# Patient Record
Sex: MALE | Race: WHITE | Employment: UNEMPLOYED | URBAN - METROPOLITAN AREA
[De-identification: names, ages, dates, MRNs, and addresses within clinical notes are randomized per-mention and may not be internally consistent; named-entity substitution may affect disease eponyms.]

---

## 2017-12-06 ENCOUNTER — HOSPITAL ENCOUNTER (OUTPATIENT)
Dept: WOUND CARE | Age: 47
Discharge: HOME | End: 2017-12-06
Payer: MEDICAID

## 2017-12-06 VITALS
WEIGHT: 315 LBS | DIASTOLIC BLOOD PRESSURE: 92 MMHG | BODY MASS INDEX: 47.74 KG/M2 | TEMPERATURE: 97.2 F | HEART RATE: 92 BPM | RESPIRATION RATE: 18 BRPM | SYSTOLIC BLOOD PRESSURE: 182 MMHG | HEIGHT: 68 IN

## 2017-12-06 PROBLEM — S81.809A WOUNDS, MULTIPLE OPEN, LOWER EXTREMITY: Status: ACTIVE | Noted: 2017-12-06

## 2017-12-06 PROCEDURE — 87070 CULTURE OTHR SPECIMN AEROBIC: CPT

## 2017-12-06 PROCEDURE — 87075 CULTR BACTERIA EXCEPT BLOOD: CPT

## 2017-12-06 PROCEDURE — 87077 CULTURE AEROBIC IDENTIFY: CPT

## 2017-12-06 PROCEDURE — 99213 OFFICE O/P EST LOW 20 MIN: CPT

## 2017-12-06 PROCEDURE — 87205 SMEAR GRAM STAIN: CPT

## 2017-12-06 RX ORDER — NYSTATIN 100000 [USP'U]/G
POWDER TOPICAL
COMMUNITY
Start: 2017-03-02

## 2017-12-06 RX ORDER — NYSTATIN 100000 U/G
CREAM TOPICAL
COMMUNITY
Start: 2017-10-02

## 2017-12-06 RX ORDER — PETROLATUM 430 MG/G
OINTMENT TOPICAL
COMMUNITY
Start: 2017-02-16

## 2017-12-06 RX ORDER — POTASSIUM CHLORIDE 20 MEQ/1
20 TABLET, EXTENDED RELEASE ORAL
COMMUNITY
Start: 2017-08-08

## 2017-12-06 RX ORDER — ATENOLOL AND CHLORTHALIDONE TABLET 50; 25 MG/1; MG/1
1 TABLET ORAL
COMMUNITY
Start: 2017-11-30

## 2017-12-06 RX ORDER — MAGNESIUM HYDROXIDE 1200 MG/15ML
LIQUID ORAL
Qty: 500 ML | Refills: 1 | Status: SHIPPED | OUTPATIENT
Start: 2017-12-06 | End: 2017-12-13

## 2017-12-06 NOTE — PROGRESS NOTES
Hellen Nolan 38   Progress Note and Procedure Note      Kevin Covarrubias  MEDICAL RECORD NUMBER:  [id number]  AGE: 56 y.o. GENDER: male  : 1970  EPISODE DATE:  2017    Subjective:     Chief Complaint   Patient presents with    Wound Check     both legs        HISTORY of PRESENT ILLNESS HPI     Kevin Covarrubias is a 56 y.o. male who presents today  for wound/ulcer evaluation. History of Wound Context: vasculitis ulcer  Wound/Ulcer Pain Timing/Severity: none  Quality of pain: N/A  Severity:  0 / 10   Modifying Factors: None  Associated Signs/Symptoms: edema, erythema and drainage  Multiple small partial thickness ulcerations and dark redness    Ulcer Identification:  Ulcer Type: vasculitis  Contributing Factors: obesity    Wound: N/A        PAST MEDICAL HISTORY        Diagnosis Date    Blood circulation, collateral     venous stasis,varicose veins,    Hernia, inguinal     Hyperlipidemia     Hypertension     Obesity     Obesity     Psychiatric problem     mild developementaly slow       PAST SURGICAL HISTORY    History reviewed. No pertinent surgical history. FAMILY HISTORY    Family History   Problem Relation Age of Onset    High Blood Pressure Mother     High Blood Pressure Brother        SOCIAL HISTORY    Social History   Substance Use Topics    Smoking status: Never Smoker    Smokeless tobacco: Never Used    Alcohol use No       ALLERGIES    Allergies   Allergen Reactions    Penicillin G Hives       MEDICATIONS    No current outpatient prescriptions on file prior to encounter. No current facility-administered medications on file prior to encounter. REVIEW OF SYSTEMS    Pertinent items are noted in HPI.     Objective:      BP (!) 182/92   Pulse 92   Temp 97.2 °F (36.2 °C) (Oral)   Resp 18   Ht 5' 8\" (1.727 m)   Wt (!) 342 lb (155.1 kg)   BMI 52.00 kg/m²     Wt Readings from Last 3 Encounters:   17 (!) 342 lb (155.1 kg)       PHYSICAL PM   Drainage Amount Small 12/6/2017  4:29 PM   Drainage Description Serosanguinous 12/6/2017  4:29 PM   Odor None 12/6/2017  4:29 PM   Margins Undefined edges 12/6/2017  4:29 PM   Maliha-wound Assessment Red;Dry 12/6/2017  4:29 PM   Non-staged Wound Description Partial thickness 12/6/2017  4:29 PM   Red%Wound Bed 100 12/6/2017  4:29 PM   Op First Treatment Date 12/06/17 12/6/2017  4:29 PM   Number of days: 0       P    Plan:     Treatment Note please see attached Discharge Instructions    In my professional opinion this patient would benefit from HBO Therapy: No    Written patient dismissal instructions given to patient and signed by patient or POA. Thank you for choosing [facility] and allowing us to help heal your wounds. If you should have any questions after your visit, please call [phone]. Discharge Instructions       Visit Discharge/Physician Orders:    Home Care: [address] [place] [address]    (Home Care To order dressings)      Wound Location:  Both Lower legs    Dressing orders: 1. Cleanse wound(s) with normal saline. 2. Apply dry SILVERCEL OR CALCIUM ALGINATE WITH Ag or eqivalent to wound bed. 3. Cover with 4x4's and wrap with gauze (jose martin or kerlix)  4. Change  Every other day or Monday, Wednesday, and Friday      Compression:  Apply med (10-20) compression hose to both lower legs, may remove at bedtime, reapply first thing in the morning, avoid prolonged standing, elevate legs when sitting    Other Instructions:   Culture at [facility]. Dr Joel Kerr to E-scribe Saline    Keep all dressings clean & dry. Do not shower, take baths or get wound wet, unless otherwise instructed by your Wound Care doctor. Follow up visit   3 Weeks  December 27, 3017  at    For Diabetic patients keep blood sugars below 150 for optimal wound healing.     If you experience any of the following, please call the Wound Care Service during business hours: [phone]   *Increase in pain   *Temperature over 101

## 2017-12-09 LAB
ANAEROBIC CULTURE: ABNORMAL
GRAM STAIN RESULT: ABNORMAL
ORGANISM: ABNORMAL
ORGANISM: ABNORMAL
WOUND/ABSCESS: ABNORMAL
WOUND/ABSCESS: ABNORMAL

## 2017-12-27 ENCOUNTER — HOSPITAL ENCOUNTER (OUTPATIENT)
Dept: WOUND CARE | Age: 47
Discharge: HOME | End: 2017-12-27
Payer: MEDICAID

## 2017-12-27 VITALS
TEMPERATURE: 97.1 F | HEART RATE: 77 BPM | RESPIRATION RATE: 18 BRPM | SYSTOLIC BLOOD PRESSURE: 118 MMHG | DIASTOLIC BLOOD PRESSURE: 73 MMHG

## 2017-12-27 PROCEDURE — 99213 OFFICE O/P EST LOW 20 MIN: CPT

## 2017-12-27 NOTE — PROGRESS NOTES
[address] [facility] Physician Billing Sheet. Ruthy Daniel  AGE: 56 y.o.    GENDER: male  : 1970  TODAY'S DATE:  2017    ICD-10 CODES  Active Hospital Problems    Diagnosis Date Noted    Wounds, multiple open, lower extremity [S81.809A] 2017       PHYSICIAN PROCEDURES    CPT CODE  87589      Electronically signed by Summer Colvin MD on 2017 at 3:05 PM

## 2017-12-27 NOTE — PROGRESS NOTES
Rinsed/Irrigated with saline 12/27/2017  2:17 PM   Wound Length (cm) 2 cm 12/27/2017  2:17 PM   Wound Width (cm) 1.8 cm 12/27/2017  2:17 PM   Wound Depth (cm)  0.1 12/27/2017  2:17 PM   Calculated Wound Size (cm^2) (l*w) 3.6 cm^2 12/27/2017  2:17 PM   Change in Wound Size % (l*w) 91.43 12/27/2017  2:17 PM   Drainage Amount Scant 12/27/2017  2:17 PM   Drainage Description Serosanguinous 12/27/2017  2:17 PM   Odor None 12/27/2017  2:17 PM   Margins Defined edges 12/27/2017  2:17 PM   Maliha-wound Assessment Dry;Red 12/27/2017  2:17 PM   Non-staged Wound Description Partial thickness 12/27/2017  2:17 PM   Red%Wound Bed 100 12/27/2017  2:17 PM   Op First Treatment Date 12/06/17 12/6/2017  4:29 PM   Number of days: 20         Plan:     Treatment Note please see attached Discharge Instructions    In my professional opinion this patient would benefit from HBO Therapy: No    Written patient dismissal instructions given to patient and signed by patient or POA. Thank you for choosing [facility] and allowing us to help heal your wounds. If you should have any questions after your visit, please call [phone]. Discharge Instructions          Discharge Instructions        Visit Discharge/Physician Orders:     Home Care: [address] [place] [address]    (Home Care To order dressings)      Wound Location:  Both Lower legs     Dressing orders: 1. Cleanse wound(s) with normal saline. 2. Apply dry SILVERCEL OR CALCIUM ALGINATE WITH Ag or eqivalent to wound bed. 3. Cover with 4x4's and wrap with gauze (ojse martin or kerlix)  4. Change  Every other day or Monday, Wednesday, and Friday      Compression:  Apply med (10-20) compression hose to both lower legs, may remove at bedtime, reapply first thing in the morning, avoid prolonged standing, elevate legs when sitting     Other Instructions:   Lotion to both legs around wounds with dressing changes     Keep all dressings clean & dry.  Do not shower, take baths or get wound wet, unless otherwise instructed by your Wound Care doctor.     Follow up visit   4 Weeks  January 24, 2018 at     For Diabetic patients keep blood sugars below 150 for optimal wound healing.     If you experience any of the following, please call the Wound Care Service during business hours: [phone]         *Increase in pain   *Temperature over 101    *Increase in drainage from your wound or a foul odor   *Uncontrolled swelling   *Need for compression bandage changes due to slippage, breakthrough drainage                                                                                                                                                                                                                                                                                                                                                                                              If you need medical attention outside of business hours, please contact your Primary Care Doctor or go to the nearest emergency room. Keep next scheduled appointment. Please give 24 hour notice if unable to keep appointment. [phone]     Patient Signature:_______________________      Nurse Signature_________________     Date: ___________ Time:  ____________          PLEASE NOTE: IF YOU ARE UNABLE TO OBTAIN WOUND SUPPLIES, CONTINUE TO USE THE SUPPLIES YOU HAVE AVAILABLE UNTIL YOU ARE ABLE TO REACH US.  IT IS MOST IMPORTANT TO KEEP THE WOUND COVERED AT ALL TIMES  Electronically signed by Vick Nuñez MD on 12/27/2017 at 3:00 PM          Electronically signed by Vick Nuñez MD on 12/27/2017 at 3:01 PM

## 2018-01-24 ENCOUNTER — HOSPITAL ENCOUNTER (OUTPATIENT)
Dept: WOUND CARE | Age: 48
Discharge: HOME | End: 2018-01-24
Payer: MEDICAID

## 2018-01-24 VITALS
DIASTOLIC BLOOD PRESSURE: 88 MMHG | SYSTOLIC BLOOD PRESSURE: 147 MMHG | RESPIRATION RATE: 18 BRPM | TEMPERATURE: 96.7 F | HEART RATE: 83 BPM

## 2018-01-24 PROCEDURE — 29580 STRAPPING UNNA BOOT: CPT

## 2018-01-24 RX ORDER — OMEPRAZOLE 20 MG/1
20 CAPSULE, DELAYED RELEASE ORAL DAILY
COMMUNITY

## 2018-01-24 NOTE — PROGRESS NOTES
Care doctor.     Follow up visit   5 Weeks  February 28, 2018 at     For Diabetic patients keep blood sugars below 150 for optimal wound healing.     If you experience any of the following, please call the Wound Care Service during business hours: [phone]         *Increase in pain   *Temperature over 101    *Increase in drainage from your wound or a foul odor   *Uncontrolled swelling   *Need for compression bandage changes due to slippage, breakthrough drainage     If you need medical attention outside of business hours, please contact your Primary Care Doctor or go to the nearest emergency room. Keep next scheduled appointment. Pascual Bonilla give 24 hour notice if unable to keep appointment. [phone]     Patient Signature:_______________________      Nurse Signature_________________     Date: ___________ Time:  ____________          PLEASE NOTE: IF YOU ARE UNABLE TO OBTAIN WOUND SUPPLIES, CONTINUE TO USE THE SUPPLIES YOU HAVE AVAILABLE UNTIL YOU ARE ABLE TO REACH US.  IT IS MOST IMPORTANT TO KEEP THE WOUND COVERED AT ALL TIMES  Electronically signed by Eliazar Garcia MD on 1/24/2018 at 2:53 PM          Electronically signed by Eliazar Garcia MD on 1/24/2018 at 2:54 PM

## 2018-04-04 ENCOUNTER — HOSPITAL ENCOUNTER (OUTPATIENT)
Dept: WOUND CARE | Age: 48
Discharge: HOME | End: 2018-04-04
Payer: MEDICAID

## 2018-04-04 VITALS
RESPIRATION RATE: 18 BRPM | TEMPERATURE: 96.4 F | HEART RATE: 86 BPM | DIASTOLIC BLOOD PRESSURE: 84 MMHG | SYSTOLIC BLOOD PRESSURE: 152 MMHG

## 2018-04-04 PROCEDURE — 99213 OFFICE O/P EST LOW 20 MIN: CPT

## 2018-05-02 ENCOUNTER — HOSPITAL ENCOUNTER (OUTPATIENT)
Dept: WOUND CARE | Age: 48
Discharge: HOME | End: 2018-05-02
Payer: MEDICAID

## 2018-05-02 VITALS
HEART RATE: 84 BPM | TEMPERATURE: 99.1 F | DIASTOLIC BLOOD PRESSURE: 77 MMHG | RESPIRATION RATE: 18 BRPM | SYSTOLIC BLOOD PRESSURE: 128 MMHG

## 2018-05-02 PROCEDURE — 99212 OFFICE O/P EST SF 10 MIN: CPT

## 2018-05-30 ENCOUNTER — HOSPITAL ENCOUNTER (OUTPATIENT)
Dept: WOUND CARE | Age: 48
Discharge: HOME | End: 2018-05-30
Payer: MEDICAID

## 2018-05-30 VITALS
DIASTOLIC BLOOD PRESSURE: 70 MMHG | RESPIRATION RATE: 18 BRPM | TEMPERATURE: 99 F | HEART RATE: 77 BPM | SYSTOLIC BLOOD PRESSURE: 125 MMHG

## 2018-05-30 PROCEDURE — 99211 OFF/OP EST MAY X REQ PHY/QHP: CPT

## 2021-06-30 LAB — RHEUMATOID FACTOR: <10 IU/ML (ref 0–15)

## 2023-12-04 RX ORDER — OMEPRAZOLE 20 MG/1
1 CAPSULE, DELAYED RELEASE ORAL DAILY
COMMUNITY
Start: 2020-07-31

## 2023-12-04 RX ORDER — POTASSIUM CHLORIDE 20 MEQ/1
10 TABLET, EXTENDED RELEASE ORAL 2 TIMES DAILY
COMMUNITY
Start: 2020-07-31 | End: 2024-02-09 | Stop reason: ALTCHOICE

## 2023-12-06 ENCOUNTER — APPOINTMENT (OUTPATIENT)
Dept: PRIMARY CARE | Facility: CLINIC | Age: 53
End: 2023-12-06
Payer: MEDICAID

## 2023-12-13 ENCOUNTER — OFFICE VISIT (OUTPATIENT)
Dept: PRIMARY CARE | Facility: CLINIC | Age: 53
End: 2023-12-13
Payer: MEDICAID

## 2023-12-13 VITALS
SYSTOLIC BLOOD PRESSURE: 154 MMHG | DIASTOLIC BLOOD PRESSURE: 90 MMHG | HEIGHT: 68 IN | TEMPERATURE: 97.7 F | WEIGHT: 304.4 LBS | BODY MASS INDEX: 46.13 KG/M2 | OXYGEN SATURATION: 97 % | HEART RATE: 69 BPM

## 2023-12-13 DIAGNOSIS — Z76.89 ESTABLISHING CARE WITH NEW DOCTOR, ENCOUNTER FOR: Primary | ICD-10-CM

## 2023-12-13 DIAGNOSIS — R32 URINARY INCONTINENCE, UNSPECIFIED TYPE: ICD-10-CM

## 2023-12-13 DIAGNOSIS — E55.9 VITAMIN D DEFICIENCY: ICD-10-CM

## 2023-12-13 DIAGNOSIS — Z00.00 HEALTHCARE MAINTENANCE: ICD-10-CM

## 2023-12-13 DIAGNOSIS — I10 ESSENTIAL HYPERTENSION, BENIGN: ICD-10-CM

## 2023-12-13 DIAGNOSIS — R35.0 URINARY FREQUENCY: ICD-10-CM

## 2023-12-13 DIAGNOSIS — R73.09 ELEVATED GLUCOSE: ICD-10-CM

## 2023-12-13 DIAGNOSIS — R20.9 COLD HANDS AND FEET: ICD-10-CM

## 2023-12-13 DIAGNOSIS — E66.01 MORBID OBESITY (MULTI): ICD-10-CM

## 2023-12-13 DIAGNOSIS — E78.2 MIXED HYPERLIPIDEMIA: ICD-10-CM

## 2023-12-13 DIAGNOSIS — I87.8 VENOUS STASIS: ICD-10-CM

## 2023-12-13 DIAGNOSIS — I1A.0 RESISTANT HYPERTENSION: ICD-10-CM

## 2023-12-13 PROBLEM — I87.2 VENOUS (PERIPHERAL) INSUFFICIENCY: Status: ACTIVE | Noted: 2023-12-13

## 2023-12-13 PROBLEM — F79 INTELLECTUAL DISABILITY: Status: ACTIVE | Noted: 2018-05-10

## 2023-12-13 PROBLEM — K81.0 ACUTE CHOLECYSTITIS: Status: ACTIVE | Noted: 2023-12-13

## 2023-12-13 LAB
POC APPEARANCE, URINE: CLEAR
POC BILIRUBIN, URINE: NEGATIVE
POC BLOOD, URINE: NEGATIVE
POC COLOR, URINE: ABNORMAL
POC GLUCOSE, URINE: NEGATIVE MG/DL
POC KETONES, URINE: NEGATIVE MG/DL
POC LEUKOCYTES, URINE: NEGATIVE
POC NITRITE,URINE: NEGATIVE
POC PH, URINE: 7.5 PH
POC PROTEIN, URINE: ABNORMAL MG/DL
POC SPECIFIC GRAVITY, URINE: 1.01
POC UROBILINOGEN, URINE: 0.2 EU/DL

## 2023-12-13 PROCEDURE — 3077F SYST BP >= 140 MM HG: CPT | Performed by: STUDENT IN AN ORGANIZED HEALTH CARE EDUCATION/TRAINING PROGRAM

## 2023-12-13 PROCEDURE — 1036F TOBACCO NON-USER: CPT | Performed by: STUDENT IN AN ORGANIZED HEALTH CARE EDUCATION/TRAINING PROGRAM

## 2023-12-13 PROCEDURE — 3008F BODY MASS INDEX DOCD: CPT | Performed by: STUDENT IN AN ORGANIZED HEALTH CARE EDUCATION/TRAINING PROGRAM

## 2023-12-13 PROCEDURE — 3080F DIAST BP >= 90 MM HG: CPT | Performed by: STUDENT IN AN ORGANIZED HEALTH CARE EDUCATION/TRAINING PROGRAM

## 2023-12-13 PROCEDURE — 99214 OFFICE O/P EST MOD 30 MIN: CPT | Performed by: STUDENT IN AN ORGANIZED HEALTH CARE EDUCATION/TRAINING PROGRAM

## 2023-12-13 PROCEDURE — 81002 URINALYSIS NONAUTO W/O SCOPE: CPT | Performed by: STUDENT IN AN ORGANIZED HEALTH CARE EDUCATION/TRAINING PROGRAM

## 2023-12-13 PROCEDURE — 87086 URINE CULTURE/COLONY COUNT: CPT

## 2023-12-13 RX ORDER — ASCORBIC ACID 500 MG
1 TABLET ORAL
COMMUNITY

## 2023-12-13 RX ORDER — NALTREXONE HYDROCHLORIDE 50 MG/1
50 TABLET, FILM COATED ORAL DAILY
COMMUNITY

## 2023-12-13 RX ORDER — POTASSIUM CHLORIDE 750 MG/1
10 TABLET, EXTENDED RELEASE ORAL 2 TIMES DAILY
COMMUNITY

## 2023-12-13 RX ORDER — VALSARTAN AND HYDROCHLOROTHIAZIDE 320; 25 MG/1; MG/1
1 TABLET, FILM COATED ORAL DAILY
COMMUNITY
End: 2024-02-09 | Stop reason: SDUPTHER

## 2023-12-13 RX ORDER — METOPROLOL SUCCINATE 100 MG/1
100 TABLET, EXTENDED RELEASE ORAL DAILY
COMMUNITY
End: 2024-02-09 | Stop reason: SDUPTHER

## 2023-12-13 RX ORDER — CLONIDINE 0.3 MG/24H
1 PATCH, EXTENDED RELEASE TRANSDERMAL
COMMUNITY
End: 2024-01-15 | Stop reason: SDUPTHER

## 2023-12-13 RX ORDER — BUPROPION HYDROCHLORIDE 150 MG/1
150 TABLET, EXTENDED RELEASE ORAL 2 TIMES DAILY
COMMUNITY
End: 2024-05-02

## 2023-12-13 RX ORDER — AMLODIPINE BESYLATE 10 MG/1
10 TABLET ORAL DAILY
COMMUNITY
Start: 2023-05-22 | End: 2024-02-09 | Stop reason: ALTCHOICE

## 2023-12-13 RX ORDER — CHOLECALCIFEROL (VITAMIN D3) 25 MCG
1 TABLET ORAL DAILY
COMMUNITY

## 2023-12-13 RX ORDER — ESCITALOPRAM OXALATE 5 MG/1
5 TABLET ORAL DAILY
COMMUNITY

## 2023-12-13 ASSESSMENT — PATIENT HEALTH QUESTIONNAIRE - PHQ9
SUM OF ALL RESPONSES TO PHQ9 QUESTIONS 1 AND 2: 0
2. FEELING DOWN, DEPRESSED OR HOPELESS: NOT AT ALL
1. LITTLE INTEREST OR PLEASURE IN DOING THINGS: NOT AT ALL

## 2023-12-13 ASSESSMENT — ENCOUNTER SYMPTOMS
DEPRESSION: 0
OCCASIONAL FEELINGS OF UNSTEADINESS: 0
LOSS OF SENSATION IN FEET: 1

## 2023-12-13 NOTE — PROGRESS NOTES
"Subjective   Patient ID: Colt Lewis is a 53 y.o. male who presents for [facility] (Patient is in office today for a new patient visit. Patient cousin advises that patient he gets anxiety when he has to go for walks. Also patient gets infection under his skin folds on his stomach which he has a powder for. ), Urinary Frequency (Patient has been having a lot more urinary frequency and has to go right away. Patient has also been having incontinence at night. ), and feet and legs (Patient has been having feet and legs numbness along with hand numbness. Patient has a home nurse that comes out three days a week that wraps his feet. ).    New patient, [facility]  Having urinary incontinence and urinary frequency  Unstable walking  Fell on knee a few months, now fearful of falling  Used to walk more  Uses walker now, helps a lot  Is complaining of cold hands and feet  Has had issues with HTN in the past    Plan  Labs  Cards referral for resistant HTN as he is on 5 meds and not well controlled  Increase exercise  Log BP and follow-up in 1 month  Check urine- urine looks good    HPI     Review of Systems    Objective   /90 (BP Location: Right arm, Patient Position: Sitting, BP Cuff Size: Adult) Comment (BP Location): Lower  Pulse 69   Temp 36.5 °C (97.7 °F) (Temporal)   Ht 1.726 m (5' 7.95\")   Wt 138 kg (304 lb 6.4 oz)   SpO2 97% Comment: RA  BMI 46.35 kg/m²     Physical Exam  Vitals reviewed.   Constitutional:       General: He is not in acute distress.     Appearance: Normal appearance. He is not ill-appearing or toxic-appearing.   HENT:      Head: Atraumatic.      Mouth/Throat:      Mouth: Mucous membranes are moist.      Pharynx: Oropharynx is clear.   Eyes:      Extraocular Movements: Extraocular movements intact.      Conjunctiva/sclera: Conjunctivae normal.      Pupils: Pupils are equal, round, and reactive to light.   Cardiovascular:      Rate and Rhythm: Normal rate and regular rhythm.      " Pulses: Normal pulses.      Heart sounds: Normal heart sounds. No murmur heard.  Pulmonary:      Effort: Pulmonary effort is normal. No respiratory distress.      Breath sounds: Normal breath sounds.   Abdominal:      General: Abdomen is flat.      Palpations: Abdomen is soft.      Tenderness: There is no abdominal tenderness.   Musculoskeletal:      Right lower leg: No edema.      Left lower leg: No edema.   Skin:     General: Skin is warm and dry.      Capillary Refill: Capillary refill takes less than 2 seconds.      Findings: No rash.   Neurological:      General: No focal deficit present.      Mental Status: He is alert.      Cranial Nerves: No cranial nerve deficit.      Gait: Gait normal.   Psychiatric:         Mood and Affect: Mood normal.         Behavior: Behavior normal.         Assessment/Plan   Problem List Items Addressed This Visit             ICD-10-CM    Elevated glucose R73.09    Relevant Orders    Comprehensive metabolic panel    Essential hypertension, benign I10    Relevant Orders    CBC    Comprehensive metabolic panel    Mixed hyperlipidemia E78.2    Relevant Orders    Lipid panel    Morbid obesity (CMS/[facility]) E66.01    Relevant Orders    CBC    Venous stasis I87.8     Other Visit Diagnoses         Codes    Establishing care with new doctor, encounter for    -  Primary Z76.89    Vitamin D deficiency     E55.9    Relevant Orders    Magnesium    Vitamin D 25-Hydroxy,Total (for eval of Vitamin D levels)    Healthcare maintenance     Z00.00    Relevant Orders    CBC    Prostate Specific Antigen    Urinary frequency     R35.0    Relevant Orders    POCT UA (nonautomated) manually resulted    Urine Culture    Urinary incontinence, unspecified type     R32    Relevant Orders    POCT UA (nonautomated) manually resulted    Urine Culture    Cold hands and feet     R20.9    Relevant Orders    Thyroid Stimulating Hormone    T4, free    Vitamin B12    Resistant hypertension     I1A.0    Relevant Orders     Referral to Cardiology

## 2023-12-14 LAB — BACTERIA UR CULT: NORMAL

## 2024-01-15 DIAGNOSIS — I10 ESSENTIAL HYPERTENSION, BENIGN: Primary | ICD-10-CM

## 2024-01-15 RX ORDER — CLONIDINE 0.3 MG/24H
1 PATCH, EXTENDED RELEASE TRANSDERMAL
Qty: 8 PATCH | Refills: 1 | Status: SHIPPED | OUTPATIENT
Start: 2024-01-15 | End: 2024-04-29

## 2024-01-15 NOTE — TELEPHONE ENCOUNTER
12/13/2023    [facility] - [place], [place] - [address] [address]  Phone: [phone] Fax: [phone]    Next office visit none

## 2024-01-23 ENCOUNTER — APPOINTMENT (OUTPATIENT)
Dept: CARDIOLOGY | Facility: CLINIC | Age: 54
End: 2024-01-23
Payer: MEDICAID

## 2024-02-05 ENCOUNTER — APPOINTMENT (OUTPATIENT)
Dept: CARDIOLOGY | Facility: CLINIC | Age: 54
End: 2024-02-05
Payer: MEDICAID

## 2024-02-09 ENCOUNTER — OFFICE VISIT (OUTPATIENT)
Dept: CARDIOLOGY | Facility: CLINIC | Age: 54
End: 2024-02-09
Payer: MEDICAID

## 2024-02-09 ENCOUNTER — APPOINTMENT (OUTPATIENT)
Dept: LAB | Facility: LAB | Age: 54
End: 2024-02-09
Payer: MEDICAID

## 2024-02-09 DIAGNOSIS — L97.929 VARICOSE VEINS OF LOWER EXTREMITIES WITH ULCER AND INFLAMMATION (MULTI): ICD-10-CM

## 2024-02-09 DIAGNOSIS — L97.919 VARICOSE VEINS OF LOWER EXTREMITIES WITH ULCER AND INFLAMMATION (MULTI): ICD-10-CM

## 2024-02-09 DIAGNOSIS — I87.2 VENOUS (PERIPHERAL) INSUFFICIENCY: ICD-10-CM

## 2024-02-09 DIAGNOSIS — E66.01 MORBID OBESITY (MULTI): ICD-10-CM

## 2024-02-09 DIAGNOSIS — I10 ESSENTIAL HYPERTENSION, BENIGN: Primary | ICD-10-CM

## 2024-02-09 DIAGNOSIS — E78.2 MIXED HYPERLIPIDEMIA: ICD-10-CM

## 2024-02-09 DIAGNOSIS — I83.219 VARICOSE VEINS OF LOWER EXTREMITIES WITH ULCER AND INFLAMMATION (MULTI): ICD-10-CM

## 2024-02-09 DIAGNOSIS — I10 PRIMARY HYPERTENSION: Primary | ICD-10-CM

## 2024-02-09 DIAGNOSIS — F79 INTELLECTUAL DISABILITY: ICD-10-CM

## 2024-02-09 DIAGNOSIS — I83.229 VARICOSE VEINS OF LOWER EXTREMITIES WITH ULCER AND INFLAMMATION (MULTI): ICD-10-CM

## 2024-02-09 DIAGNOSIS — I1A.0 RESISTANT HYPERTENSION: ICD-10-CM

## 2024-02-09 PROCEDURE — 3008F BODY MASS INDEX DOCD: CPT | Performed by: INTERNAL MEDICINE

## 2024-02-09 PROCEDURE — 93000 ELECTROCARDIOGRAM COMPLETE: CPT | Performed by: INTERNAL MEDICINE

## 2024-02-09 PROCEDURE — 1036F TOBACCO NON-USER: CPT | Performed by: INTERNAL MEDICINE

## 2024-02-09 PROCEDURE — 99204 OFFICE O/P NEW MOD 45 MIN: CPT | Performed by: INTERNAL MEDICINE

## 2024-02-09 RX ORDER — AMLODIPINE BESYLATE 5 MG/1
5 TABLET ORAL DAILY
COMMUNITY
End: 2024-03-15

## 2024-02-09 RX ORDER — DOXAZOSIN 1 MG/1
1 TABLET ORAL NIGHTLY
Qty: 90 TABLET | Refills: 3 | Status: SHIPPED | OUTPATIENT
Start: 2024-02-09 | End: 2024-03-11 | Stop reason: SDUPTHER

## 2024-02-09 RX ORDER — NEBULIZER AND COMPRESSOR
1 EACH MISCELLANEOUS DAILY
Qty: 1 EACH | Refills: 0 | Status: SHIPPED | OUTPATIENT
Start: 2024-02-09

## 2024-02-09 RX ORDER — AMLODIPINE BESYLATE 10 MG/1
10 TABLET ORAL DAILY
Qty: 90 TABLET | Refills: 1 | Status: SHIPPED | OUTPATIENT
Start: 2024-02-09

## 2024-02-09 RX ORDER — METOPROLOL SUCCINATE 100 MG/1
100 TABLET, EXTENDED RELEASE ORAL DAILY
Qty: 90 TABLET | Refills: 1 | Status: SHIPPED | OUTPATIENT
Start: 2024-02-09

## 2024-02-09 RX ORDER — VALSARTAN AND HYDROCHLOROTHIAZIDE 320; 25 MG/1; MG/1
1 TABLET, FILM COATED ORAL DAILY
Qty: 90 TABLET | Refills: 1 | Status: SHIPPED | OUTPATIENT
Start: 2024-02-09

## 2024-02-09 NOTE — TELEPHONE ENCOUNTER
Patient POA is calling for med refills   Last OV 12/2023  Pending OV- none, sent to front to schedule physical.

## 2024-02-09 NOTE — PROGRESS NOTES
CARDIOLOGY NEW PATIENT OFFICE VISIT    Patient:  Colt Higuera  YOB: 1970  MRN: [id number]       Chief Complaint:      Chief Complaint   Patient presents with    New Patient Visit       History of Present Illness:     History of Present Illness: Mr. Colt Higuera is a 53-year-old male patient being seen at the request of Dr. Nick Newton for evaluation of cardiac status in the setting of resistant hypertension.  He is accompanied by his cousin.  She provided the majority of his history.  He has noted to be intellectually challenged.  He does not have any history of atherosclerotic heart or valvular heart disease.  He has a history of hypertension dating back more than 20 years.  He has been treated primarily at [facility].  Most recently he has been maintained on amlodipine, Catapres TTS patch, Toprol-XL, and valsartan HCT.  He has a history of hyperlipidemia.  No history of diabetes mellitus.  He does not smoke.  He has a history of morbid obesity ,chronic venous insufficiency, and chronic edema.  He has a home health nurse who comes out 3 days a week to wrap his legs.  He notes that several family members developed hypertension at young ages.  He does not specifically recall having had any evaluation for secondary hypertension.  He has been hospitalized on a few occasions with hypertensive urgency.  He apparently has not very compliant with his medications or with diet.  He admittedly is very sedentary.  He ambulates with use of a walker.    He denies any chest pain or shortness of breath.  He denies any orthopnea or PND.  He denies any palpitations, lightheadedness, near-syncope, or syncope.  He denies any fever, chills, or cough.  He denies any nausea, vomiting, or diaphoresis.  He denies any hemoptysis, hematemesis, melena, or hematochezia.    Lab studies dated March 31, 2013 showed a normal CBC and CMP with exception of potassium 2.9.  High-sensitivity troponin levels 8 and 10.   Magnesium level 1.87.  EKG in the office today shows sinus tachycardia with nonspecific ST-T wave changes.  An echocardiogram May 1, 2023 showed an estimated LV ejection fraction 60 to 65%.  Trivial tricuspid regurgitation.      Allergies:     Allergies   Allergen Reactions    Penicillins Hives    Sulfamethoxazole-Trimethoprim Rash          Outpatient Medications:     Current Outpatient Medications   Medication Instructions    amLODIPine (NORVASC) 10 mg, oral, Daily    amLODIPine (NORVASC) 5 mg, oral, Daily    buPROPion SR (WELLBUTRIN SR) 150 mg, oral, 2 times daily    calcium carb/mag oxide/Cu/zinc (calcium-magnesium-copper-zinc) tablet 1 tablet, oral, Daily RT    cholecalciferol (Vitamin D-3) 25 MCG (1000 UT) tablet 1 tablet, oral, Daily    cloNIDine (Catapres-TTS) 0.3 mg/24 hr patch 1 patch, transdermal, Weekly    escitalopram (LEXAPRO) 5 mg, oral, Daily    metoprolol succinate XL (TOPROL-XL) 100 mg, oral, Daily    naltrexone (DEPADE) 50 mg, oral, Daily    omeprazole (PriLOSEC) 20 mg DR capsule 1 capsule, oral, Daily    potassium chloride CR 10 mEq ER tablet 10 mEq, oral, 2 times daily    valsartan-hydrochlorothiazide (Diovan-HCT) 320-25 mg tablet 1 tablet, oral, Daily        Past Medical History:     No past medical history on file.    Social History:     Social History     Tobacco Use    Smoking status: Never    Smokeless tobacco: Never   Vaping Use    Vaping Use: Never used   Substance Use Topics    Alcohol use: Never    Drug use: Never       Family History:     Family History   Problem Relation Name Age of Onset    Hypertension Mother      Colon cancer Mother      Hypertension Father         Review of Systems:     12 point review of systems completed and is negative other than that detailed above.       Objective:     There were no vitals filed for this visit.    Wt Readings from Last 4 Encounters:   12/13/23 138 kg (304 lb 6.4 oz)   07/31/20 133 kg (294 lb)       Physical Examination:   GENERAL:  Well  developed, well nourished, in no acute distress.  CHEST:  Symmetric and nontender.  NEURO/PSYCH:  Alert and oriented times three with approppriate behavior and responses.  NECK:  Supple, no JVD, no bruit.  LUNGS:  Clear to auscultation bilaterally, normal respiratory effort.  HEART:  Rate and rhythm regular with no evident murmur, no gallop appreciated.        There are no rubs, clicks or heaves.  EXTREMITIES: Bilateral wraps in place.  Chronic venous stasis changes.  There is 3+ edema noted.  PERIPHERAL VASCULAR:  Pulses present and equally palpable; 2+ throughout.      Lab:     CBC:   Lab Results   Component Value Date    WBC 9.6 03/31/2023    RBC 5.23 03/31/2023    HGB 14.1 03/31/2023    HCT 42.6 03/31/2023     03/31/2023        CMP:    Lab Results   Component Value Date     04/01/2023    K 3.1 (L) 04/01/2023     04/01/2023    CO2 27 04/01/2023    BUN 6 04/01/2023    CREATININE 0.69 04/01/2023    GLUCOSE 140 (H) 04/01/2023    CALCIUM 9.1 04/01/2023       Magnesium:    Lab Results   Component Value Date    MG 1.87 03/31/2023       BNP:   Lab Results   Component Value Date    BNP 66 01/12/2023        PT/INR:    Lab Results   Component Value Date    PROTIME 14.6 (H) 03/31/2023    INR 1.3 (H) 03/31/2023       BMP:  Lab Results   Component Value Date     04/01/2023     03/31/2023     02/09/2023    K 3.1 (L) 04/01/2023    K 2.9 (LL) 03/31/2023    K 3.2 (L) 02/09/2023     04/01/2023     03/31/2023     02/09/2023    CO2 27 04/01/2023    CO2 27 03/31/2023    CO2 28 02/09/2023    BUN 6 04/01/2023    BUN 7 03/31/2023    BUN 6 02/09/2023    CREATININE 0.69 04/01/2023    CREATININE 0.68 03/31/2023    CREATININE 0.68 02/09/2023       CBC:  Lab Results   Component Value Date    WBC 9.6 03/31/2023    WBC 8.5 02/09/2023    WBC 11.1 01/12/2023    RBC 5.23 03/31/2023    RBC 4.99 02/09/2023    RBC 5.23 01/12/2023    HGB 14.1 03/31/2023    HGB 13.5 02/09/2023    HGB 14.0  01/12/2023    HCT 42.6 03/31/2023    HCT 41.2 02/09/2023    HCT 42.5 01/12/2023    MCV 81 03/31/2023    MCV 83 02/09/2023    MCV 81 01/12/2023    MCHC 33.1 03/31/2023    MCHC 32.8 02/09/2023    MCHC 32.9 01/12/2023    RDW 13.5 03/31/2023    RDW 13.8 02/09/2023    RDW 14.3 01/12/2023     03/31/2023     02/09/2023     01/12/2023       Cardiac Enzymes:    Lab Results   Component Value Date    TROPHS 10 03/31/2023    TROPHS 10 03/31/2023    TROPHS 8 03/31/2023       Hepatic Function Panel:    Lab Results   Component Value Date    ALKPHOS 91 03/31/2023    ALT 16 03/31/2023    AST 13 03/31/2023    PROT 7.2 03/31/2023    BILITOT 0.6 03/31/2023    BILIDIR 0.1 07/03/2020       Diagnostic Studies:     Echocardiogram  Result Date: 4/1/2023    CONCLUSIONS: 1. Left ventricle shows no apparent LVH or LV dilatation with preserved systolic function with an estimate ejection fraction in the range of 60-65% with no apparent regional wall motion abnormalities, Definity contrast was used for clarification. 2. LV diastolic dysfunction 3. Normal right ventricular size and function 4. Structurally and functionally normal aortic, mitral, tricuspid and pulmonic valves with trace tricuspid insufficiency with no elevation of the RVSP at 22 5. Normal left and right atrium 6. Normal aortic root and ascending aorta 7. Normal inferior vena cava diameter in the range of 1.6 possible less than 50% respiratory inspiratory response. 8. No apparent pericardial fusion, echo producing mass or intracardiac shunt 9. No comparison tracing available for comment Please excuse any errors in grammar or translation related to this dictation. Voice recognition software was utilized to prepare this document. Juaquin ALEGRE     CT head wo IV contrast  Result Date: 4/1/2023    No acute intracranial hemorrhage, mass effect or midline shift.  Nonspecific scattered white matter hypodensities favored to represent sequela of small vessel ischemia.         XR chest 1 view  Result Date: 3/31/2023    No radiographic evidence of acute cardiopulmonary disease.   Signed by James Damon MD      Problem List:     Patient Active Problem List   Diagnosis    Acute cholecystitis    Elevated glucose    Essential hypertension, benign    Gastroesophageal reflux disease    Intellectual disability    Mixed hyperlipidemia    Morbid obesity (CMS/HCC)    Varicose veins of lower extremities with ulcer and inflammation (CMS/HCC)    Venous (peripheral) insufficiency    Venous stasis       Assessment:     Problem List Items Addressed This Visit             ICD-10-CM    Intellectual disability F79    Mixed hyperlipidemia E78.2    Morbid obesity (CMS/HCC) E66.01    Varicose veins of lower extremities with ulcer and inflammation (CMS/HCC) I83.229, I83.219, L97.919, L97.929    Venous (peripheral) insufficiency I87.2     Other Visit Diagnoses         Codes    Primary hypertension    -  Primary I10    Relevant Orders    ECG 12 Lead (Completed)    Resistant hypertension     I1A.0    Relevant Medications    doxazosin (Cardura) 1 mg tablet    miscellaneous medical supply (Blood Pressure Cuff) misc    Other Relevant Orders    Follow Up In Cardiology    Vascular US renal artery duplex complete    Comprehensive metabolic panel    Renin Activity    Aldosterone/Renin Activity Ratio    Catecholamines, Fractionated, Plasma    Aldosterone, Serum    Metanephrines Plasma            Plan:     -He will be started on doxazosin 1 mg daily.      -He will otherwise be continued on his same medications.     -He was advised to restrict fluids to less than 2 liters per day.     -He was advised to restrict sodium with an aim of no more than 2 grams per day.     -He was advised on the need for regular exercise.    -He was advised on the need to follow a low carbohydrate/low saturated fat/weight reducing diet.     -He will be scheduled for a renal arterial ultrasound.    -Will obtain fractionated catecholamines and  metanephrines along with PRA/PAC levels.  He is not currently on Aldactone.  Will need to interpret these levels with the understanding that he currently is taking valsartan.    -Further recommendations to follow.

## 2024-03-04 ENCOUNTER — ANCILLARY PROCEDURE (OUTPATIENT)
Dept: CARDIOLOGY | Facility: CLINIC | Age: 54
End: 2024-03-04
Payer: MEDICAID

## 2024-03-04 ENCOUNTER — LAB (OUTPATIENT)
Dept: LAB | Facility: LAB | Age: 54
End: 2024-03-04
Payer: MEDICAID

## 2024-03-04 DIAGNOSIS — R73.09 ELEVATED GLUCOSE: ICD-10-CM

## 2024-03-04 DIAGNOSIS — E78.2 MIXED HYPERLIPIDEMIA: ICD-10-CM

## 2024-03-04 DIAGNOSIS — E66.01 MORBID OBESITY (MULTI): ICD-10-CM

## 2024-03-04 DIAGNOSIS — I1A.0 RESISTANT HYPERTENSION: ICD-10-CM

## 2024-03-04 DIAGNOSIS — I10 ESSENTIAL HYPERTENSION, BENIGN: ICD-10-CM

## 2024-03-04 DIAGNOSIS — R20.9 COLD HANDS AND FEET: ICD-10-CM

## 2024-03-04 DIAGNOSIS — I10 ESSENTIAL (PRIMARY) HYPERTENSION: ICD-10-CM

## 2024-03-04 DIAGNOSIS — Z00.00 HEALTHCARE MAINTENANCE: ICD-10-CM

## 2024-03-04 DIAGNOSIS — E55.9 VITAMIN D DEFICIENCY: ICD-10-CM

## 2024-03-04 LAB
25(OH)D3 SERPL-MCNC: 24 NG/ML (ref 30–100)
ALBUMIN SERPL BCP-MCNC: 4.1 G/DL (ref 3.4–5)
ALP SERPL-CCNC: 75 U/L (ref 33–120)
ALT SERPL W P-5'-P-CCNC: 10 U/L (ref 10–52)
ANION GAP SERPL CALC-SCNC: 14 MMOL/L (ref 10–20)
AST SERPL W P-5'-P-CCNC: 11 U/L (ref 9–39)
BILIRUB SERPL-MCNC: 0.4 MG/DL (ref 0–1.2)
BUN SERPL-MCNC: 12 MG/DL (ref 6–23)
CALCIUM SERPL-MCNC: 9.3 MG/DL (ref 8.6–10.3)
CHLORIDE SERPL-SCNC: 101 MMOL/L (ref 98–107)
CHOLEST SERPL-MCNC: 185 MG/DL (ref 0–199)
CHOLESTEROL/HDL RATIO: 4
CO2 SERPL-SCNC: 29 MMOL/L (ref 21–32)
CREAT SERPL-MCNC: 0.75 MG/DL (ref 0.5–1.3)
EGFRCR SERPLBLD CKD-EPI 2021: >90 ML/MIN/1.73M*2
ERYTHROCYTE [DISTWIDTH] IN BLOOD BY AUTOMATED COUNT: 14 % (ref 11.5–14.5)
GLUCOSE SERPL-MCNC: 100 MG/DL (ref 74–99)
HCT VFR BLD AUTO: 43.3 % (ref 41–52)
HDLC SERPL-MCNC: 45.8 MG/DL
HGB BLD-MCNC: 13.9 G/DL (ref 13.5–17.5)
LDLC SERPL CALC-MCNC: 118 MG/DL
MAGNESIUM SERPL-MCNC: 1.8 MG/DL (ref 1.6–2.4)
MCH RBC QN AUTO: 27.5 PG (ref 26–34)
MCHC RBC AUTO-ENTMCNC: 32.1 G/DL (ref 32–36)
MCV RBC AUTO: 86 FL (ref 80–100)
NON HDL CHOLESTEROL: 139 MG/DL (ref 0–149)
NRBC BLD-RTO: 0 /100 WBCS (ref 0–0)
PLATELET # BLD AUTO: 247 X10*3/UL (ref 150–450)
POTASSIUM SERPL-SCNC: 3.7 MMOL/L (ref 3.5–5.3)
PROT SERPL-MCNC: 7.4 G/DL (ref 6.4–8.2)
PSA SERPL-MCNC: 0.15 NG/ML
RBC # BLD AUTO: 5.05 X10*6/UL (ref 4.5–5.9)
SODIUM SERPL-SCNC: 140 MMOL/L (ref 136–145)
T4 FREE SERPL-MCNC: 0.94 NG/DL (ref 0.61–1.12)
TRIGL SERPL-MCNC: 108 MG/DL (ref 0–149)
TSH SERPL-ACNC: 1.11 MIU/L (ref 0.44–3.98)
VIT B12 SERPL-MCNC: 318 PG/ML (ref 211–911)
VLDL: 22 MG/DL (ref 0–40)
WBC # BLD AUTO: 7.6 X10*3/UL (ref 4.4–11.3)

## 2024-03-04 PROCEDURE — 83835 ASSAY OF METANEPHRINES: CPT

## 2024-03-04 PROCEDURE — 82306 VITAMIN D 25 HYDROXY: CPT

## 2024-03-04 PROCEDURE — 82607 VITAMIN B-12: CPT

## 2024-03-04 PROCEDURE — 83735 ASSAY OF MAGNESIUM: CPT

## 2024-03-04 PROCEDURE — 80053 COMPREHEN METABOLIC PANEL: CPT

## 2024-03-04 PROCEDURE — 82088 ASSAY OF ALDOSTERONE: CPT

## 2024-03-04 PROCEDURE — 93975 VASCULAR STUDY: CPT | Performed by: INTERNAL MEDICINE

## 2024-03-04 PROCEDURE — 84439 ASSAY OF FREE THYROXINE: CPT

## 2024-03-04 PROCEDURE — 80061 LIPID PANEL: CPT

## 2024-03-04 PROCEDURE — 85027 COMPLETE CBC AUTOMATED: CPT

## 2024-03-04 PROCEDURE — 93975 VASCULAR STUDY: CPT

## 2024-03-04 PROCEDURE — 36415 COLL VENOUS BLD VENIPUNCTURE: CPT

## 2024-03-04 PROCEDURE — 84153 ASSAY OF PSA TOTAL: CPT

## 2024-03-04 PROCEDURE — 82384 ASSAY THREE CATECHOLAMINES: CPT

## 2024-03-04 PROCEDURE — 84443 ASSAY THYROID STIM HORMONE: CPT

## 2024-03-05 NOTE — RESULT ENCOUNTER NOTE
Labs are mostly normal, except for low vitamins D and B12.  He should start taking a supplement for both of these.  1000 IU daily of vitamin D3 and 1000 mcg of vitamin B12 daily.  He should establish with a new PCP and follow these levels up in 3-6 months.  Thank you!

## 2024-03-06 ENCOUNTER — TELEPHONE (OUTPATIENT)
Dept: CARDIOLOGY | Facility: CLINIC | Age: 54
End: 2024-03-06
Payer: MEDICAID

## 2024-03-06 NOTE — TELEPHONE ENCOUNTER
----- Message from Sudeep Sanchez MD sent at 3/6/2024 12:02 PM EST -----  Renal arterial ultrasound reviewed and is normal.  Normal blood flow to the kidney arteries.  Continue same and follow-up as scheduled.  Thanks.

## 2024-03-07 ENCOUNTER — TELEPHONE (OUTPATIENT)
Dept: CARDIOLOGY | Facility: CLINIC | Age: 54
End: 2024-03-07
Payer: MEDICAID

## 2024-03-07 LAB
ALDOST SERPL-MCNC: 14.2 NG/DL
ALDOST/RENIN PLAS-RTO: 7.1 RATIO
ANNOTATION COMMENT IMP: NORMAL
METANEPHS SERPL-SCNC: <0.1 NMOL/L (ref 0–0.49)
NORMETANEPH FREE SERPL-SCNC: 0.2 NMOL/L (ref 0–0.89)
RENIN PLAS-CCNC: 2 NG/ML/HR

## 2024-03-07 NOTE — TELEPHONE ENCOUNTER
----- Message from Sudeep Sanchez MD sent at 3/7/2024  1:10 PM EST -----  Labs reviewed reviewed and are normal.  No evidence of any elevated adrenaline type hormone that could cause an increase of his blood pressure.  Continue same and follow-up as scheduled.  Thanks.

## 2024-03-11 DIAGNOSIS — I1A.0 RESISTANT HYPERTENSION: ICD-10-CM

## 2024-03-11 LAB
DOPAMINE SERPL-MCNC: 48 PG/ML (ref 0–48)
EPINEPH PLAS-MCNC: 30 PG/ML (ref 0–62)
NOREPINEPH PLAS-MCNC: 200 PG/ML (ref 0–874)

## 2024-03-11 NOTE — TELEPHONE ENCOUNTER
SPOKE WITH PT'S BROTHER STATES THAT PT TOOK VALSARTAN 320-25, AMLODIPINE 10 MG, METOPROLOL 100 MG THIS MORNING. PT RECHECKED BP WHILE ON PHONE AND READ 186/118 ADVISED THAT IF PT WHERE TO DEVELOP SYMPTOMS THAT HE NEEDS TO CALL 911. FORWARDED MESSAGE TO Dr. Sudeep Sanchez MD, FACC TO ADVISE. Apolinar

## 2024-03-11 NOTE — TELEPHONE ENCOUNTER
MARGOTH PT'S HOME HEALTH NURSE CALLING STATES THAT PT HAD RECEIVED LARGE CUFF AND TODAY WHEN TAKING BP  RIGHT ARM WAS READING 192/119 AND RIGHT WRIST /106.  LEFT ARM READ 193/114 LEFT WRIST /102 PULSE 65. STATES THAT PT IS ASYMPTOMATIC. NO ISSUES. STATES SINCE PT STARTED DOXAZOSIN 1 MG AT NIGHT THAT PT'S PRESSURES HAVE BEEN HIGHER. FORWARDED TO Dr. Sudeep Sanchez MD, FACC TO ADVISE. LEFT VOICEMAIL FOR PT TO CONTACT OFFICE FOR FURTHER INFORMATION. Apolinar

## 2024-03-12 RX ORDER — DOXAZOSIN 2 MG/1
2 TABLET ORAL
Qty: 180 TABLET | Refills: 3 | Status: SHIPPED | OUTPATIENT
Start: 2024-03-12 | End: 2025-03-12

## 2024-03-12 NOTE — TELEPHONE ENCOUNTER
----- Message from Sudeep Sanchez MD sent at 3/11/2024  5:29 PM EDT -----  Labs reviewed and look fine.  Nothing to suggest any type of adrenaline hormone in the blood which would up the blood pressure.  Continue same and follow-up as scheduled.

## 2024-03-15 ENCOUNTER — HOSPITAL ENCOUNTER (INPATIENT)
Facility: HOSPITAL | Age: 54
LOS: 2 days | Discharge: HOME | End: 2024-03-17
Attending: EMERGENCY MEDICINE | Admitting: STUDENT IN AN ORGANIZED HEALTH CARE EDUCATION/TRAINING PROGRAM
Payer: MEDICAID

## 2024-03-15 ENCOUNTER — APPOINTMENT (OUTPATIENT)
Dept: CARDIOLOGY | Facility: HOSPITAL | Age: 54
End: 2024-03-15
Payer: MEDICAID

## 2024-03-15 ENCOUNTER — APPOINTMENT (OUTPATIENT)
Dept: RADIOLOGY | Facility: HOSPITAL | Age: 54
End: 2024-03-15
Payer: MEDICAID

## 2024-03-15 DIAGNOSIS — R53.83 FATIGUE, UNSPECIFIED TYPE: ICD-10-CM

## 2024-03-15 DIAGNOSIS — I16.0 HYPERTENSIVE URGENCY: Primary | ICD-10-CM

## 2024-03-15 LAB
ALBUMIN SERPL BCP-MCNC: 4.1 G/DL (ref 3.4–5)
ALP SERPL-CCNC: 77 U/L (ref 33–120)
ALT SERPL W P-5'-P-CCNC: 12 U/L (ref 10–52)
ANION GAP SERPL CALC-SCNC: 11 MMOL/L (ref 10–20)
APPEARANCE UR: CLEAR
AST SERPL W P-5'-P-CCNC: 13 U/L (ref 9–39)
BASOPHILS # BLD AUTO: 0.03 X10*3/UL (ref 0–0.1)
BASOPHILS NFR BLD AUTO: 0.4 %
BILIRUB SERPL-MCNC: 0.3 MG/DL (ref 0–1.2)
BILIRUB UR STRIP.AUTO-MCNC: NEGATIVE MG/DL
BUN SERPL-MCNC: 12 MG/DL (ref 6–23)
CALCIUM SERPL-MCNC: 9.3 MG/DL (ref 8.6–10.3)
CHLORIDE SERPL-SCNC: 101 MMOL/L (ref 98–107)
CO2 SERPL-SCNC: 31 MMOL/L (ref 21–32)
COLOR UR: COLORLESS
CREAT SERPL-MCNC: 0.75 MG/DL (ref 0.5–1.3)
EGFRCR SERPLBLD CKD-EPI 2021: >90 ML/MIN/1.73M*2
EOSINOPHIL # BLD AUTO: 0.19 X10*3/UL (ref 0–0.7)
EOSINOPHIL NFR BLD AUTO: 2.6 %
ERYTHROCYTE [DISTWIDTH] IN BLOOD BY AUTOMATED COUNT: 13.7 % (ref 11.5–14.5)
FLUAV RNA RESP QL NAA+PROBE: NOT DETECTED
FLUBV RNA RESP QL NAA+PROBE: NOT DETECTED
GLUCOSE SERPL-MCNC: 96 MG/DL (ref 74–99)
GLUCOSE UR STRIP.AUTO-MCNC: NEGATIVE MG/DL
HCT VFR BLD AUTO: 43 % (ref 41–52)
HGB BLD-MCNC: 14.3 G/DL (ref 13.5–17.5)
IMM GRANULOCYTES # BLD AUTO: 0.02 X10*3/UL (ref 0–0.7)
IMM GRANULOCYTES NFR BLD AUTO: 0.3 % (ref 0–0.9)
KETONES UR STRIP.AUTO-MCNC: NEGATIVE MG/DL
LEUKOCYTE ESTERASE UR QL STRIP.AUTO: NEGATIVE
LYMPHOCYTES # BLD AUTO: 1.59 X10*3/UL (ref 1.2–4.8)
LYMPHOCYTES NFR BLD AUTO: 22.1 %
MCH RBC QN AUTO: 27.8 PG (ref 26–34)
MCHC RBC AUTO-ENTMCNC: 33.3 G/DL (ref 32–36)
MCV RBC AUTO: 84 FL (ref 80–100)
MONOCYTES # BLD AUTO: 0.54 X10*3/UL (ref 0.1–1)
MONOCYTES NFR BLD AUTO: 7.5 %
NEUTROPHILS # BLD AUTO: 4.84 X10*3/UL (ref 1.2–7.7)
NEUTROPHILS NFR BLD AUTO: 67.1 %
NITRITE UR QL STRIP.AUTO: NEGATIVE
NRBC BLD-RTO: 0 /100 WBCS (ref 0–0)
PH UR STRIP.AUTO: 8 [PH]
PLATELET # BLD AUTO: 250 X10*3/UL (ref 150–450)
POTASSIUM SERPL-SCNC: 3.7 MMOL/L (ref 3.5–5.3)
PROT SERPL-MCNC: 7.8 G/DL (ref 6.4–8.2)
PROT UR STRIP.AUTO-MCNC: NEGATIVE MG/DL
RBC # BLD AUTO: 5.14 X10*6/UL (ref 4.5–5.9)
RBC # UR STRIP.AUTO: NEGATIVE /UL
SARS-COV-2 RNA RESP QL NAA+PROBE: NOT DETECTED
SODIUM SERPL-SCNC: 139 MMOL/L (ref 136–145)
SP GR UR STRIP.AUTO: 1
UROBILINOGEN UR STRIP.AUTO-MCNC: <2 MG/DL
WBC # BLD AUTO: 7.2 X10*3/UL (ref 4.4–11.3)

## 2024-03-15 PROCEDURE — 99291 CRITICAL CARE FIRST HOUR: CPT | Performed by: HOSPITALIST

## 2024-03-15 PROCEDURE — 96374 THER/PROPH/DIAG INJ IV PUSH: CPT

## 2024-03-15 PROCEDURE — 2500000004 HC RX 250 GENERAL PHARMACY W/ HCPCS (ALT 636 FOR OP/ED): Performed by: HOSPITALIST

## 2024-03-15 PROCEDURE — 84443 ASSAY THYROID STIM HORMONE: CPT | Performed by: HOSPITALIST

## 2024-03-15 PROCEDURE — 2500000004 HC RX 250 GENERAL PHARMACY W/ HCPCS (ALT 636 FOR OP/ED): Performed by: PHYSICIAN ASSISTANT

## 2024-03-15 PROCEDURE — 96376 TX/PRO/DX INJ SAME DRUG ADON: CPT

## 2024-03-15 PROCEDURE — 71045 X-RAY EXAM CHEST 1 VIEW: CPT

## 2024-03-15 PROCEDURE — 36415 COLL VENOUS BLD VENIPUNCTURE: CPT | Performed by: PHYSICIAN ASSISTANT

## 2024-03-15 PROCEDURE — 80053 COMPREHEN METABOLIC PANEL: CPT | Performed by: PHYSICIAN ASSISTANT

## 2024-03-15 PROCEDURE — 71045 X-RAY EXAM CHEST 1 VIEW: CPT | Performed by: RADIOLOGY

## 2024-03-15 PROCEDURE — 2500000001 HC RX 250 WO HCPCS SELF ADMINISTERED DRUGS (ALT 637 FOR MEDICARE OP): Performed by: HOSPITALIST

## 2024-03-15 PROCEDURE — 99285 EMERGENCY DEPT VISIT HI MDM: CPT | Mod: 25

## 2024-03-15 PROCEDURE — 99291 CRITICAL CARE FIRST HOUR: CPT | Performed by: PHYSICIAN ASSISTANT

## 2024-03-15 PROCEDURE — 96375 TX/PRO/DX INJ NEW DRUG ADDON: CPT

## 2024-03-15 PROCEDURE — 99291 CRITICAL CARE FIRST HOUR: CPT

## 2024-03-15 PROCEDURE — 2500000002 HC RX 250 W HCPCS SELF ADMINISTERED DRUGS (ALT 637 FOR MEDICARE OP, ALT 636 FOR OP/ED): Performed by: HOSPITALIST

## 2024-03-15 PROCEDURE — 2020000001 HC ICU ROOM DAILY

## 2024-03-15 PROCEDURE — 81003 URINALYSIS AUTO W/O SCOPE: CPT | Performed by: PHYSICIAN ASSISTANT

## 2024-03-15 PROCEDURE — 93005 ELECTROCARDIOGRAM TRACING: CPT

## 2024-03-15 PROCEDURE — 87636 SARSCOV2 & INF A&B AMP PRB: CPT | Performed by: PHYSICIAN ASSISTANT

## 2024-03-15 PROCEDURE — 85025 COMPLETE CBC W/AUTO DIFF WBC: CPT | Performed by: PHYSICIAN ASSISTANT

## 2024-03-15 PROCEDURE — 2500000001 HC RX 250 WO HCPCS SELF ADMINISTERED DRUGS (ALT 637 FOR MEDICARE OP): Performed by: PHYSICIAN ASSISTANT

## 2024-03-15 PROCEDURE — 80307 DRUG TEST PRSMV CHEM ANLYZR: CPT | Performed by: HOSPITALIST

## 2024-03-15 RX ORDER — CLONIDINE HYDROCHLORIDE 0.1 MG/1
0.1 TABLET ORAL ONCE
Status: COMPLETED | OUTPATIENT
Start: 2024-03-15 | End: 2024-03-15

## 2024-03-15 RX ORDER — LABETALOL HYDROCHLORIDE 5 MG/ML
20 INJECTION, SOLUTION INTRAVENOUS ONCE
Status: COMPLETED | OUTPATIENT
Start: 2024-03-15 | End: 2024-03-15

## 2024-03-15 RX ORDER — METOPROLOL SUCCINATE 50 MG/1
100 TABLET, EXTENDED RELEASE ORAL DAILY
Status: DISCONTINUED | OUTPATIENT
Start: 2024-03-16 | End: 2024-03-17 | Stop reason: HOSPADM

## 2024-03-15 RX ORDER — ASCORBIC ACID 500 MG
1 TABLET ORAL
Status: DISCONTINUED | OUTPATIENT
Start: 2024-03-16 | End: 2024-03-15

## 2024-03-15 RX ORDER — HYDRALAZINE HYDROCHLORIDE 20 MG/ML
10 INJECTION INTRAMUSCULAR; INTRAVENOUS ONCE
Status: COMPLETED | OUTPATIENT
Start: 2024-03-15 | End: 2024-03-15

## 2024-03-15 RX ORDER — POTASSIUM CHLORIDE 750 MG/1
10 TABLET, FILM COATED, EXTENDED RELEASE ORAL 2 TIMES DAILY
Status: DISCONTINUED | OUTPATIENT
Start: 2024-03-15 | End: 2024-03-17 | Stop reason: HOSPADM

## 2024-03-15 RX ORDER — CLONIDINE 0.3 MG/24H
1 PATCH, EXTENDED RELEASE TRANSDERMAL
Status: DISCONTINUED | OUTPATIENT
Start: 2024-03-21 | End: 2024-03-17 | Stop reason: HOSPADM

## 2024-03-15 RX ORDER — AMLODIPINE BESYLATE 5 MG/1
10 TABLET ORAL ONCE
Status: COMPLETED | OUTPATIENT
Start: 2024-03-15 | End: 2024-03-15

## 2024-03-15 RX ORDER — NICARDIPINE HYDROCHLORIDE 0.2 MG/ML
0-15 INJECTION INTRAVENOUS CONTINUOUS
Status: DISCONTINUED | OUTPATIENT
Start: 2024-03-15 | End: 2024-03-16

## 2024-03-15 RX ORDER — MAGNESIUM SULFATE HEPTAHYDRATE 40 MG/ML
4 INJECTION, SOLUTION INTRAVENOUS ONCE
Status: COMPLETED | OUTPATIENT
Start: 2024-03-15 | End: 2024-03-16

## 2024-03-15 RX ORDER — CLONIDINE 0.3 MG/24H
1 PATCH, EXTENDED RELEASE TRANSDERMAL
Status: DISCONTINUED | OUTPATIENT
Start: 2024-03-16 | End: 2024-03-15

## 2024-03-15 RX ORDER — CHOLECALCIFEROL (VITAMIN D3) 25 MCG
1000 TABLET ORAL DAILY
Status: DISCONTINUED | OUTPATIENT
Start: 2024-03-16 | End: 2024-03-17 | Stop reason: HOSPADM

## 2024-03-15 RX ORDER — BUPROPION HYDROCHLORIDE 150 MG/1
150 TABLET, EXTENDED RELEASE ORAL 2 TIMES DAILY
Status: DISCONTINUED | OUTPATIENT
Start: 2024-03-15 | End: 2024-03-17 | Stop reason: HOSPADM

## 2024-03-15 RX ORDER — DOXAZOSIN 1 MG/1
2 TABLET ORAL
Status: DISCONTINUED | OUTPATIENT
Start: 2024-03-16 | End: 2024-03-17 | Stop reason: HOSPADM

## 2024-03-15 RX ORDER — POTASSIUM CHLORIDE 20 MEQ/1
40 TABLET, EXTENDED RELEASE ORAL ONCE
Status: COMPLETED | OUTPATIENT
Start: 2024-03-15 | End: 2024-03-15

## 2024-03-15 RX ORDER — ENOXAPARIN SODIUM 100 MG/ML
40 INJECTION SUBCUTANEOUS EVERY 12 HOURS SCHEDULED
Status: DISCONTINUED | OUTPATIENT
Start: 2024-03-15 | End: 2024-03-17 | Stop reason: HOSPADM

## 2024-03-15 RX ORDER — ESCITALOPRAM OXALATE 10 MG/1
5 TABLET ORAL DAILY
Status: DISCONTINUED | OUTPATIENT
Start: 2024-03-16 | End: 2024-03-17 | Stop reason: HOSPADM

## 2024-03-15 RX ORDER — AMLODIPINE BESYLATE 5 MG/1
10 TABLET ORAL DAILY
Status: DISCONTINUED | OUTPATIENT
Start: 2024-03-16 | End: 2024-03-17 | Stop reason: HOSPADM

## 2024-03-15 RX ORDER — PANTOPRAZOLE SODIUM 40 MG/1
40 TABLET, DELAYED RELEASE ORAL DAILY
Status: DISCONTINUED | OUTPATIENT
Start: 2024-03-16 | End: 2024-03-17 | Stop reason: HOSPADM

## 2024-03-15 RX ORDER — NALTREXONE HYDROCHLORIDE 50 MG/1
50 TABLET, FILM COATED ORAL DAILY
Status: DISCONTINUED | OUTPATIENT
Start: 2024-03-16 | End: 2024-03-17 | Stop reason: HOSPADM

## 2024-03-15 RX ADMIN — CLONIDINE HYDROCHLORIDE 0.1 MG: 0.1 TABLET ORAL at 16:45

## 2024-03-15 RX ADMIN — LABETALOL HYDROCHLORIDE 20 MG: 5 INJECTION, SOLUTION INTRAVENOUS at 16:10

## 2024-03-15 RX ADMIN — LABETALOL HYDROCHLORIDE 20 MG: 5 INJECTION, SOLUTION INTRAVENOUS at 19:44

## 2024-03-15 RX ADMIN — BUPROPION HYDROCHLORIDE 150 MG: 150 TABLET, EXTENDED RELEASE ORAL at 23:39

## 2024-03-15 RX ADMIN — HYDRALAZINE HYDROCHLORIDE 10 MG: 20 INJECTION INTRAMUSCULAR; INTRAVENOUS at 23:38

## 2024-03-15 RX ADMIN — POTASSIUM CHLORIDE 40 MEQ: 1500 TABLET, EXTENDED RELEASE ORAL at 23:38

## 2024-03-15 RX ADMIN — MAGNESIUM SULFATE HEPTAHYDRATE 4 G: 40 INJECTION, SOLUTION INTRAVENOUS at 23:37

## 2024-03-15 RX ADMIN — HYDRALAZINE HYDROCHLORIDE 10 MG: 20 INJECTION INTRAMUSCULAR; INTRAVENOUS at 19:45

## 2024-03-15 RX ADMIN — AMLODIPINE BESYLATE 10 MG: 5 TABLET ORAL at 23:39

## 2024-03-15 RX ADMIN — ENOXAPARIN SODIUM 40 MG: 40 INJECTION SUBCUTANEOUS at 23:37

## 2024-03-15 RX ADMIN — CLONIDINE HYDROCHLORIDE 0.1 MG: 0.1 TABLET ORAL at 18:55

## 2024-03-15 RX ADMIN — HYDRALAZINE HYDROCHLORIDE 10 MG: 20 INJECTION INTRAMUSCULAR; INTRAVENOUS at 18:55

## 2024-03-15 SDOH — SOCIAL STABILITY: SOCIAL INSECURITY: ARE YOU OR HAVE YOU BEEN THREATENED OR ABUSED PHYSICALLY, EMOTIONALLY, OR SEXUALLY BY ANYONE?: NO

## 2024-03-15 SDOH — SOCIAL STABILITY: SOCIAL INSECURITY: WERE YOU ABLE TO COMPLETE ALL THE BEHAVIORAL HEALTH SCREENINGS?: YES

## 2024-03-15 SDOH — SOCIAL STABILITY: SOCIAL INSECURITY: DO YOU FEEL ANYONE HAS EXPLOITED OR TAKEN ADVANTAGE OF YOU FINANCIALLY OR OF YOUR PERSONAL PROPERTY?: NO

## 2024-03-15 SDOH — SOCIAL STABILITY: SOCIAL INSECURITY: DO YOU FEEL UNSAFE GOING BACK TO THE PLACE WHERE YOU ARE LIVING?: NO

## 2024-03-15 SDOH — SOCIAL STABILITY: SOCIAL INSECURITY: HAS ANYONE EVER THREATENED TO HURT YOUR FAMILY OR YOUR PETS?: NO

## 2024-03-15 SDOH — SOCIAL STABILITY: SOCIAL INSECURITY: DOES ANYONE TRY TO KEEP YOU FROM HAVING/CONTACTING OTHER FRIENDS OR DOING THINGS OUTSIDE YOUR HOME?: NO

## 2024-03-15 SDOH — SOCIAL STABILITY: SOCIAL INSECURITY: ARE THERE ANY APPARENT SIGNS OF INJURIES/BEHAVIORS THAT COULD BE RELATED TO ABUSE/NEGLECT?: NO

## 2024-03-15 SDOH — SOCIAL STABILITY: SOCIAL INSECURITY: ABUSE: ADULT

## 2024-03-15 SDOH — SOCIAL STABILITY: SOCIAL INSECURITY: HAVE YOU HAD THOUGHTS OF HARMING ANYONE ELSE?: NO

## 2024-03-15 ASSESSMENT — COGNITIVE AND FUNCTIONAL STATUS - GENERAL
MOVING TO AND FROM BED TO CHAIR: A LITTLE
WALKING IN HOSPITAL ROOM: A LITTLE
CLIMB 3 TO 5 STEPS WITH RAILING: A LITTLE
MOBILITY SCORE: 21
DAILY ACTIVITIY SCORE: 24
PATIENT BASELINE BEDBOUND: NO

## 2024-03-15 ASSESSMENT — LIFESTYLE VARIABLES
PRESCIPTION_ABUSE_PAST_12_MONTHS: NO
SKIP TO QUESTIONS 9-10: 1
AUDIT-C TOTAL SCORE: 0
HOW OFTEN DO YOU HAVE A DRINK CONTAINING ALCOHOL: NEVER
HOW OFTEN DO YOU HAVE 6 OR MORE DRINKS ON ONE OCCASION: NEVER
SUBSTANCE_ABUSE_PAST_12_MONTHS: NO
EVER FELT BAD OR GUILTY ABOUT YOUR DRINKING: NO
HAVE PEOPLE ANNOYED YOU BY CRITICIZING YOUR DRINKING: NO
HAVE YOU EVER FELT YOU SHOULD CUT DOWN ON YOUR DRINKING: NO
AUDIT-C TOTAL SCORE: 0
EVER HAD A DRINK FIRST THING IN THE MORNING TO STEADY YOUR NERVES TO GET RID OF A HANGOVER: NO
HOW MANY STANDARD DRINKS CONTAINING ALCOHOL DO YOU HAVE ON A TYPICAL DAY: PATIENT DOES NOT DRINK

## 2024-03-15 ASSESSMENT — PAIN - FUNCTIONAL ASSESSMENT
PAIN_FUNCTIONAL_ASSESSMENT: 0-10
PAIN_FUNCTIONAL_ASSESSMENT: 0-10

## 2024-03-15 ASSESSMENT — COLUMBIA-SUICIDE SEVERITY RATING SCALE - C-SSRS
6. HAVE YOU EVER DONE ANYTHING, STARTED TO DO ANYTHING, OR PREPARED TO DO ANYTHING TO END YOUR LIFE?: NO
2. HAVE YOU ACTUALLY HAD ANY THOUGHTS OF KILLING YOURSELF?: NO
1. IN THE PAST MONTH, HAVE YOU WISHED YOU WERE DEAD OR WISHED YOU COULD GO TO SLEEP AND NOT WAKE UP?: NO

## 2024-03-15 ASSESSMENT — ACTIVITIES OF DAILY LIVING (ADL)
DRESSING YOURSELF: INDEPENDENT
JUDGMENT_ADEQUATE_SAFELY_COMPLETE_DAILY_ACTIVITIES: YES
LACK_OF_TRANSPORTATION: NO
WALKS IN HOME: NEEDS ASSISTANCE
ASSISTIVE_DEVICE: DENTURES UPPER;EYEGLASSES;WALKER
TOILETING: INDEPENDENT
ADEQUATE_TO_COMPLETE_ADL: YES
GROOMING: INDEPENDENT
PATIENT'S MEMORY ADEQUATE TO SAFELY COMPLETE DAILY ACTIVITIES?: YES
BATHING: INDEPENDENT
HEARING - LEFT EAR: FUNCTIONAL
FEEDING YOURSELF: INDEPENDENT
HEARING - RIGHT EAR: FUNCTIONAL

## 2024-03-15 ASSESSMENT — PATIENT HEALTH QUESTIONNAIRE - PHQ9
SUM OF ALL RESPONSES TO PHQ9 QUESTIONS 1 & 2: 0
1. LITTLE INTEREST OR PLEASURE IN DOING THINGS: NOT AT ALL
2. FEELING DOWN, DEPRESSED OR HOPELESS: NOT AT ALL

## 2024-03-15 ASSESSMENT — PAIN SCALES - GENERAL
PAINLEVEL_OUTOF10: 0 - NO PAIN
PAINLEVEL_OUTOF10: 0 - NO PAIN

## 2024-03-15 NOTE — TELEPHONE ENCOUNTER
Pt's brother calling to advise that pt was taken to [facility] for elevated BP. Apolinar

## 2024-03-15 NOTE — ED PROVIDER NOTES
HPI   Chief Complaint   Patient presents with    Hypertension     Pt states when his home health aide came to change his bandages on his legs and took his BP it was high and she thought he should come get it checked out. Pt denies headache, dizziness, SOB, chest pain.       53-year-old male patient with history of hypertension on blood pressure medications sent to the emergency department today by EMS secondary to elevated blood pressure.  According to report patient's home health nurse took his blood pressure and it was in the 200s systolically.  Patient states he has not missed any of his medications.  Denies any headaches, vision changes, chest pain, shortness of breath, fevers, chills.  He has no complaints at this present time.  This purpose he was brought to the emergency department today further evaluation.                            [place] Coma Scale Score: 15                     Patient History   Past Medical History:   Diagnosis Date    Hypertension      Past Surgical History:   Procedure Laterality Date    COLONOSCOPY      OTHER SURGICAL HISTORY  07/31/2020    Cholecystectomy laparoscopic     Family History   Problem Relation Name Age of Onset    Hypertension Mother      Colon cancer Mother      Hypertension Father       Social History     Tobacco Use    Smoking status: Never    Smokeless tobacco: Never   Vaping Use    Vaping Use: Never used   Substance Use Topics    Alcohol use: Never    Drug use: Never       Physical Exam   ED Triage Vitals   Temp Pulse Resp BP   -- -- -- --      SpO2 Temp src Heart Rate Source Patient Position   -- -- -- --      BP Location FiO2 (%)     -- --       Physical Exam  Constitutional:       General: He is not in acute distress.     Appearance: Normal appearance. He is not ill-appearing or diaphoretic.   HENT:      Head: Normocephalic and atraumatic.      Nose: Nose normal.   Eyes:      Extraocular Movements: Extraocular movements intact.      Conjunctiva/sclera:  Conjunctivae normal.      Pupils: Pupils are equal, round, and reactive to light.   Cardiovascular:      Rate and Rhythm: Normal rate and regular rhythm.   Pulmonary:      Effort: Pulmonary effort is normal. No respiratory distress.      Breath sounds: Normal breath sounds. No stridor. No wheezing.   Abdominal:      General: Abdomen is flat.      Tenderness: There is no right CVA tenderness or left CVA tenderness.   Musculoskeletal:         General: Normal range of motion.      Cervical back: Normal range of motion.   Skin:     General: Skin is warm and dry.   Neurological:      General: No focal deficit present.      Mental Status: He is alert and oriented to person, place, and time. Mental status is at baseline.   Psychiatric:         Mood and Affect: Mood normal.         ED Course & MDM   Diagnoses as of 03/15/24 1621   Asymptomatic hypertension       Medical Decision Making  53-year-old male patient with history of hypertension on blood pressure medications sent to the emergency department today by EMS secondary to elevated blood pressure.  According to report patient's home health nurse took his blood pressure and it was in the 200s systolically.  Patient states he has not missed any of his medications.  Denies any headaches, vision changes, chest pain, shortness of breath, fevers, chills.  He has no complaints at this present time.  This purpose he was brought to the emergency department today further evaluation.    EKG, chest x-ray, laboratory studies ordered.  Order electrolyte abnormalities, acute kidney injury, mediastinal widening, leukocytosis or left shift.    Patient negative for COVID-19 and influenza.  Patient metabolic panel negative.  Patient's urinalysis negative.  Patient CBC negative.  Patient's chest x-ray negative.  IV labetalol ordered for the patient for his blood pressure was 240/111.  As well as p.o. Catapres.    Historian is the patient    Diagnosis: Hypertension  Handoff to Landry  JOSE ENRIQUE Guevara pending reevaluation after medication administration and reevaluation        Labs Reviewed   URINALYSIS WITH REFLEX CULTURE AND MICROSCOPIC - Abnormal       Result Value    Color, Urine Colorless (*)     Appearance, Urine Clear      Specific Gravity, Urine 1.004 (*)     pH, Urine 8.0      Protein, Urine NEGATIVE      Glucose, Urine NEGATIVE      Blood, Urine NEGATIVE      Ketones, Urine NEGATIVE      Bilirubin, Urine NEGATIVE      Urobilinogen, Urine <2.0      Nitrite, Urine NEGATIVE      Leukocyte Esterase, Urine NEGATIVE     COMPREHENSIVE METABOLIC PANEL - Normal    Glucose 96      Sodium 139      Potassium 3.7      Chloride 101      Bicarbonate 31      Anion Gap 11      Urea Nitrogen 12      Creatinine 0.75      eGFR >90      Calcium 9.3      Albumin 4.1      Alkaline Phosphatase 77      Total Protein 7.8      AST 13      Bilirubin, Total 0.3      ALT 12     SARS-COV-2 PCR - Normal    Coronavirus 2019, PCR Not Detected      Narrative:     This assay has received FDA Emergency Use Authorization (EUA) and is only authorized for the duration of time that circumstances exist to justify the authorization of the emergency use of in vitro diagnostic tests for the detection of SARS-CoV-2 virus and/or diagnosis of COVID-19 infection under section 564(b)(1) of the Act, 21 U.S.C. 360bbb-3(b)(1). This assay is an in vitro diagnostic nucleic acid amplification test for the qualitative detection of SARS-CoV-2 from nasopharyngeal specimens and has been validated for use at [place]. Negative results do not preclude COVID-19 infections and should not be used as the sole basis for diagnosis, treatment, or other management decisions.     INFLUENZA A AND B PCR - Normal    Flu A Result Not Detected      Flu B Result Not Detected      Narrative:     This assay is an in vitro diagnostic multiplex nucleic acid amplification test for the detection and discrimination of Influenza A & B from  nasopharyngeal specimens, and has been validated for use at [facility]. Negative results do not preclude Influenza A/B infections, and should not be used as the sole basis for diagnosis, treatment, or other management decisions. If Influenza A/B and RSV PCR results are negative, testing for Parainfluenza virus, Adenovirus and Metapneumovirus is routinely performed for [facility] pediatric oncology and intensive care inpatients, and is available on other patients by placing an add-on request.   CBC WITH AUTO DIFFERENTIAL    WBC 7.2      nRBC 0.0      RBC 5.14      Hemoglobin 14.3      Hematocrit 43.0      MCV 84      MCH 27.8      MCHC 33.3      RDW 13.7      Platelets 250      Neutrophils % 67.1      Immature Granulocytes %, Automated 0.3      Lymphocytes % 22.1      Monocytes % 7.5      Eosinophils % 2.6      Basophils % 0.4      Neutrophils Absolute 4.84      Immature Granulocytes Absolute, Automated 0.02      Lymphocytes Absolute 1.59      Monocytes Absolute 0.54      Eosinophils Absolute 0.19      Basophils Absolute 0.03     URINALYSIS WITH REFLEX CULTURE AND MICROSCOPIC    Narrative:     The following orders were created for panel order Urinalysis with Reflex Culture and Microscopic.  Procedure                               Abnormality         Status                     ---------                               -----------         ------                     Urinalysis with Reflex C...[[id number]]  Abnormal            Final result               Extra Urine Gray Tube[[id number]]                            In process                   Please view results for these tests on the individual orders.   EXTRA URINE GRAY TUBE        XR chest 1 view   Final Result   1.  No active cardiopulmonary process.             Signed by: Peng Davis 3/15/2024 3:58 PM   Dictation workstation:   [id number]            Procedure  Procedures     Ryan Grullon PA-C  03/15/24 9281     Dorsal Nasal Flap Text: The defect edges were debeveled with a #15 scalpel blade.  Given the location of the defect and the proximity to free margins a dorsal nasal flap was deemed most appropriate.  Using a sterile surgical marker, an appropriate dorsal nasal flap was drawn around the defect.    The area thus outlined was incised deep to adipose tissue with a #15 scalpel blade.  The skin margins were undermined to an appropriate distance in all directions utilizing iris scissors.

## 2024-03-15 NOTE — PROGRESS NOTES
Emergency Medicine Transition of Care Note.    I received Colt Lewis in signout from Ryan Grullon PA-C.  Please see the previous ED provider note for all HPI, PE and MDM up to the time of signout at 1600. This is in addition to the primary record.    In brief Colt Lewis is an 53 y.o. male presenting for   Chief Complaint   Patient presents with    Hypertension     Pt states when his home health aide came to change his bandages on his legs and took his BP it was high and she thought he should come get it checked out. Pt denies headache, dizziness, SOB, chest pain.     At the time of signout we were awaiting: repeat BP after meds    Diagnoses as of 03/15/24 2116   Hypertensive urgency   Fatigue, unspecified type       Medical Decision Making  I discussed the results of the lab work with the patient on repeat exam he is still having elevated blood pressure.  At 2030 his BP was 214/101 with a heart rate of 89 respirations 18 pulse ox was 95% on room air he was given hydralazine 10 mg IV push and an additional Catapres 0.1 mg p.o.  After observing the patient's blood pressure remains elevated 214/101 with a heart rate of 89 respirations 18 pulse ox is 95% on room air  I then ordered an additional 20 mg of labetalol IV push and 10 mg of hydralazine IV push.  Repeat vital signs at 2046 hrs. 199/101 heart rate 89.    Patient's EKG was interpreted by me at 1622 hrs. normal sinus rhythm with nonspecific T wave abnormalities rate 99 , QRS was 90  QTc was 490 no ischemic changes.    Final diagnoses:   [I10] Asymptomatic hypertension           Procedure  Critical Care    Performed by: Landry Guevara PA-C  Authorized by: Connor Randhawa MD    Critical care provider statement:     Critical care time (minutes):  34    Critical care time was exclusive of:  Separately billable procedures and treating other patients    Critical care was necessary to treat or prevent imminent or life-threatening  deterioration of the following conditions: hypertensive urgency.    Critical care was time spent personally by me on the following activities:  Examination of patient, evaluation of patient's response to treatment, obtaining history from patient or surrogate, ordering and review of laboratory studies, ordering and review of radiographic studies, re-evaluation of patient's condition and review of old charts    Care discussed with: admitting provider        Landry Guevara PA-C

## 2024-03-16 LAB
AMPHETAMINES UR QL SCN: NORMAL
ANION GAP SERPL CALC-SCNC: 11 MMOL/L (ref 10–20)
ATRIAL RATE: 99 BPM
BARBITURATES UR QL SCN: NORMAL
BENZODIAZ UR QL SCN: NORMAL
BUN SERPL-MCNC: 11 MG/DL (ref 6–23)
BZE UR QL SCN: NORMAL
CALCIUM SERPL-MCNC: 8.8 MG/DL (ref 8.6–10.3)
CANNABINOIDS UR QL SCN: NORMAL
CHLORIDE SERPL-SCNC: 103 MMOL/L (ref 98–107)
CO2 SERPL-SCNC: 27 MMOL/L (ref 21–32)
CREAT SERPL-MCNC: 0.6 MG/DL (ref 0.5–1.3)
EGFRCR SERPLBLD CKD-EPI 2021: >90 ML/MIN/1.73M*2
ERYTHROCYTE [DISTWIDTH] IN BLOOD BY AUTOMATED COUNT: 14 % (ref 11.5–14.5)
FENTANYL+NORFENTANYL UR QL SCN: NORMAL
GLUCOSE SERPL-MCNC: 144 MG/DL (ref 74–99)
HCT VFR BLD AUTO: 40.7 % (ref 41–52)
HGB BLD-MCNC: 13.4 G/DL (ref 13.5–17.5)
HOLD SPECIMEN: NORMAL
MAGNESIUM SERPL-MCNC: 2.54 MG/DL (ref 1.6–2.4)
MCH RBC QN AUTO: 27.7 PG (ref 26–34)
MCHC RBC AUTO-ENTMCNC: 32.9 G/DL (ref 32–36)
MCV RBC AUTO: 84 FL (ref 80–100)
METHADONE UR QL SCN: NORMAL
NRBC BLD-RTO: 0 /100 WBCS (ref 0–0)
OPIATES UR QL SCN: NORMAL
OXYCODONE+OXYMORPHONE UR QL SCN: NORMAL
P AXIS: 35 DEGREES
P OFFSET: 191 MS
P ONSET: 118 MS
PCP UR QL SCN: NORMAL
PLATELET # BLD AUTO: 240 X10*3/UL (ref 150–450)
POTASSIUM SERPL-SCNC: 3.8 MMOL/L (ref 3.5–5.3)
PR INTERVAL: 204 MS
Q ONSET: 220 MS
QRS COUNT: 16 BEATS
QRS DURATION: 90 MS
QT INTERVAL: 382 MS
QTC CALCULATION(BAZETT): 490 MS
QTC FREDERICIA: 451 MS
R AXIS: 39 DEGREES
RBC # BLD AUTO: 4.84 X10*6/UL (ref 4.5–5.9)
SODIUM SERPL-SCNC: 137 MMOL/L (ref 136–145)
T AXIS: 45 DEGREES
T OFFSET: 411 MS
TSH SERPL-ACNC: 1.2 MIU/L (ref 0.44–3.98)
VENTRICULAR RATE: 99 BPM
WBC # BLD AUTO: 9.5 X10*3/UL (ref 4.4–11.3)

## 2024-03-16 PROCEDURE — 80048 BASIC METABOLIC PNL TOTAL CA: CPT | Performed by: HOSPITALIST

## 2024-03-16 PROCEDURE — 2500000002 HC RX 250 W HCPCS SELF ADMINISTERED DRUGS (ALT 637 FOR MEDICARE OP, ALT 636 FOR OP/ED): Performed by: NURSE PRACTITIONER

## 2024-03-16 PROCEDURE — 1210000001 HC SEMI-PRIVATE ROOM DAILY

## 2024-03-16 PROCEDURE — 85027 COMPLETE CBC AUTOMATED: CPT | Performed by: HOSPITALIST

## 2024-03-16 PROCEDURE — 36415 COLL VENOUS BLD VENIPUNCTURE: CPT | Performed by: HOSPITALIST

## 2024-03-16 PROCEDURE — 99233 SBSQ HOSP IP/OBS HIGH 50: CPT | Performed by: NURSE PRACTITIONER

## 2024-03-16 PROCEDURE — 2500000004 HC RX 250 GENERAL PHARMACY W/ HCPCS (ALT 636 FOR OP/ED): Performed by: NURSE PRACTITIONER

## 2024-03-16 PROCEDURE — 2500000001 HC RX 250 WO HCPCS SELF ADMINISTERED DRUGS (ALT 637 FOR MEDICARE OP): Performed by: HOSPITALIST

## 2024-03-16 PROCEDURE — 99221 1ST HOSP IP/OBS SF/LOW 40: CPT | Performed by: INTERNAL MEDICINE

## 2024-03-16 PROCEDURE — 83735 ASSAY OF MAGNESIUM: CPT | Performed by: HOSPITALIST

## 2024-03-16 PROCEDURE — 2500000001 HC RX 250 WO HCPCS SELF ADMINISTERED DRUGS (ALT 637 FOR MEDICARE OP): Performed by: NURSE PRACTITIONER

## 2024-03-16 PROCEDURE — 2500000002 HC RX 250 W HCPCS SELF ADMINISTERED DRUGS (ALT 637 FOR MEDICARE OP, ALT 636 FOR OP/ED): Performed by: HOSPITALIST

## 2024-03-16 PROCEDURE — 2500000004 HC RX 250 GENERAL PHARMACY W/ HCPCS (ALT 636 FOR OP/ED): Performed by: HOSPITALIST

## 2024-03-16 RX ORDER — ENOXAPARIN SODIUM 100 MG/ML
40 INJECTION SUBCUTANEOUS DAILY
Status: DISCONTINUED | OUTPATIENT
Start: 2024-03-16 | End: 2024-03-16

## 2024-03-16 RX ADMIN — DOXAZOSIN 2 MG: 1 TABLET ORAL at 15:02

## 2024-03-16 RX ADMIN — ENOXAPARIN SODIUM 40 MG: 40 INJECTION SUBCUTANEOUS at 20:39

## 2024-03-16 RX ADMIN — NALTREXONE HYDROCHLORIDE 50 MG: 50 TABLET, FILM COATED ORAL at 10:10

## 2024-03-16 RX ADMIN — POTASSIUM CHLORIDE 10 MEQ: 750 TABLET, EXTENDED RELEASE ORAL at 20:39

## 2024-03-16 RX ADMIN — BUPROPION HYDROCHLORIDE 150 MG: 150 TABLET, EXTENDED RELEASE ORAL at 10:09

## 2024-03-16 RX ADMIN — METOPROLOL SUCCINATE 100 MG: 50 TABLET, EXTENDED RELEASE ORAL at 10:09

## 2024-03-16 RX ADMIN — Medication 1000 UNITS: at 10:09

## 2024-03-16 RX ADMIN — DOXAZOSIN 2 MG: 1 TABLET ORAL at 10:10

## 2024-03-16 RX ADMIN — POTASSIUM CHLORIDE 10 MEQ: 750 TABLET, EXTENDED RELEASE ORAL at 10:10

## 2024-03-16 RX ADMIN — VALSARTAN: 160 TABLET, FILM COATED ORAL at 10:09

## 2024-03-16 RX ADMIN — ENOXAPARIN SODIUM 40 MG: 40 INJECTION SUBCUTANEOUS at 10:08

## 2024-03-16 RX ADMIN — ESCITALOPRAM OXALATE 5 MG: 10 TABLET, FILM COATED ORAL at 10:08

## 2024-03-16 RX ADMIN — AMLODIPINE BESYLATE 10 MG: 5 TABLET ORAL at 10:10

## 2024-03-16 RX ADMIN — BUPROPION HYDROCHLORIDE 150 MG: 150 TABLET, EXTENDED RELEASE ORAL at 20:39

## 2024-03-16 RX ADMIN — PANTOPRAZOLE SODIUM 40 MG: 40 TABLET, DELAYED RELEASE ORAL at 06:41

## 2024-03-16 ASSESSMENT — PAIN - FUNCTIONAL ASSESSMENT
PAIN_FUNCTIONAL_ASSESSMENT: 0-10

## 2024-03-16 ASSESSMENT — COGNITIVE AND FUNCTIONAL STATUS - GENERAL
WALKING IN HOSPITAL ROOM: A LITTLE
TOILETING: A LITTLE
DRESSING REGULAR UPPER BODY CLOTHING: A LITTLE
MOVING TO AND FROM BED TO CHAIR: A LITTLE
DAILY ACTIVITIY SCORE: 20
CLIMB 3 TO 5 STEPS WITH RAILING: A LITTLE
MOVING TO AND FROM BED TO CHAIR: A LITTLE
MOBILITY SCORE: 21
HELP NEEDED FOR BATHING: A LITTLE
MOBILITY SCORE: 21
CLIMB 3 TO 5 STEPS WITH RAILING: A LITTLE
DAILY ACTIVITIY SCORE: 24
WALKING IN HOSPITAL ROOM: A LITTLE
PERSONAL GROOMING: A LITTLE

## 2024-03-16 ASSESSMENT — PAIN SCALES - GENERAL
PAINLEVEL_OUTOF10: 0 - NO PAIN

## 2024-03-16 NOTE — CARE PLAN
The patient's goals for the shift include      The clinical goals for the shift include sbp </= 140

## 2024-03-16 NOTE — PROGRESS NOTES
[facility] Critical Care Medicine       Date:  3/16/2024  Patient:  Colt Lewis  YOB: 1970  MRN:  [id number]   Admit Date:  3/15/2024  ========================================================================================================    Chief Complaint   Patient presents with    Hypertension     Pt states when his home health aide came to change his bandages on his legs and took his BP it was high and she thought he should come get it checked out. Pt denies headache, dizziness, SOB, chest pain.         History of Present Illness:  Colt Leiws is a 53 y.o. year old  male patient with significant past Medical History listed below including resistant hypertension, morbid obesity and intellectual disability, initially presented to the ED per recommendation from home health aide as his SBP was reading in the 200s.  Home health nurse told ED provider he did not miss any doses of his medications.  According to documentation patient just recently started following Dr. Sanchez from [facility] and his Amlodipine and Doxazosin were recently increased.  Patient has documented history of resistant hypertension.  Patient also notes lower extremity edema which is chronic given venous insufficiency, bilateral lower extremities are wrapped from the knees to the feet for wounds secondary to venous insufficiency.  Patient denies vision changes, headache or any other focal neurodeficit or changes.  Patient also denies chest pain or shortness of breath.     SBP consistently in the 200s while in ED despite receiving 20 mg IV labetalol x 2, 10 mg IV hydralazine x 2, Catapres 0.1 mg x 2.  Patient stated he had already taken his antihypertensives in the morning.  Patient also has clonidine patch on to left chest wall that was changed on 3/14.  All lab work is completely unremarkable.  EKG was normal sinus rhythm.  Chest x-ray negative for acute process.      Interval ICU Events:  Admitted to ICU for  resistant hypertension/asymptomatic hypertensive urgency with plan to start on nicardipine drip however when patient arrived to ICU BP was 170's/80's HR 80's.  Nicardipine was never started, told RN to hold off on starting and we will try other measures as BP is improving and I do not want to drop his blood pressure too quickly.     3/16/24  Alert and oriented x 3.  Some minor intellectual delay.  Never was on nicardipine.  BP stable this morning.  Denies any chest pain or shortness of breath.    Medical History:  Past Medical History:   Diagnosis Date    Cholecystitis, acute     Gastroesophageal reflux disease 02/11/2009    Hypertension     Intellectual disability 05/10/2018    Mixed hyperlipidemia 12/13/2023    Morbid obesity with body mass index (BMI) of 40.0 or higher (CMS/HCC)     Resistant hypertension     Venous (peripheral) insufficiency 12/13/2023    Formatting of this note might be different from the original. chronic swelling of LE; w venous stasis dermatitis     Past Surgical History:   Procedure Laterality Date    CHOLECYSTECTOMY  2020    COLONOSCOPY  07/13/2023     Medications Prior to Admission   Medication Sig Dispense Refill Last Dose    amLODIPine (Norvasc) 10 mg tablet Take 1 tablet (10 mg) by mouth once daily. 90 tablet 1 3/15/2024    buPROPion SR (Wellbutrin SR) 150 mg 12 hr tablet Take 1 tablet (150 mg) by mouth 2 times a day.   3/15/2024    calcium carb/mag oxide/Cu/zinc (calcium-magnesium-copper-zinc) tablet Take 1 tablet by mouth once daily.   3/15/2024    cholecalciferol (Vitamin D-3) 25 MCG (1000 UT) tablet Take 1 tablet (25 mcg) by mouth once daily.   3/15/2024    cloNIDine (Catapres-TTS) 0.3 mg/24 hr patch Place 1 patch on the skin 1 (one) time per week. 8 patch 1 3/15/2024    doxazosin (Cardura) 2 mg tablet Take 1 tablet (2 mg) by mouth 2 times a day. 180 tablet 3 3/15/2024    escitalopram (Lexapro) 5 mg tablet Take 1 tablet (5 mg) by mouth once daily.   3/15/2024    metoprolol  "succinate XL (Toprol-XL) 100 mg 24 hr tablet Take 1 tablet (100 mg) by mouth once daily. 90 tablet 1 3/15/2024    miscellaneous medical supply (Blood Pressure Cuff) misc 1 Device once daily. 1 each 0 Unknown    naltrexone (Depade) 50 mg tablet Take 1 tablet (50 mg) by mouth once daily.   3/15/2024    omeprazole (PriLOSEC) 20 mg DR capsule Take 1 capsule (20 mg) by mouth once daily.   3/15/2024    potassium chloride CR 10 mEq ER tablet Take 1 tablet (10 mEq) by mouth 2 times a day.   3/15/2024    valsartan-hydrochlorothiazide (Diovan-HCT) 320-25 mg tablet Take 1 tablet by mouth once daily. 90 tablet 1 3/15/2024     Penicillins and Sulfamethoxazole-trimethoprim  Social History     Tobacco Use    Smoking status: Never    Smokeless tobacco: Never   Vaping Use    Vaping Use: Never used   Substance Use Topics    Alcohol use: Never    Drug use: Never     Family History   Problem Relation Name Age of Onset    Hypertension Mother      Colon cancer Mother      Hypertension Father         Review of Systems:  14 point review of systems was completed and negative except for those specially mention in my HPI    Physical Exam:    Heart Rate:  []   Temp:  [36.4 °C (97.5 °F)-36.7 °C (98.1 °F)]   Resp:  [12-23]   BP: (110-247)/()   Height:  [170.2 cm (5' 7\")]   Weight:  [134 kg (294 lb 12.1 oz)-134 kg (295 lb)]   SpO2:  [93 %-97 %]     Physical Exam  Vitals and nursing note reviewed.   HENT:      Head: Normocephalic.      Mouth/Throat:      Mouth: Mucous membranes are moist.   Eyes:      Pupils: Pupils are equal, round, and reactive to light.   Cardiovascular:      Rate and Rhythm: Normal rate and regular rhythm.   Pulmonary:      Effort: Pulmonary effort is normal.   Abdominal:      Palpations: Abdomen is soft.   Musculoskeletal:         General: Normal range of motion.      Cervical back: Normal range of motion.      Right lower le+ Edema present.      Left lower le+ Edema present.   Skin:     General: Skin is " warm and dry.      Capillary Refill: Capillary refill takes less than 2 seconds.   Neurological:      Mental Status: He is alert.   Psychiatric:         Mood and Affect: Mood normal.         Objective:    I have reviewed all medications, laboratory results, and imaging pertinent for today's encounter    Assessment/Plan:    I am currently managing this critically ill patient for the following problems:    I am currently managing this critically ill patient for the following problems:     Asymptomatic Hypertensive Urgency  Resistant Hypertension     Neuro/Psych/Pain Ctrl/Sedation:  -Neurochecks every shift     Respiratory/ENT:  -Supplemental oxygen as needed to maintain SpO2 greater than 92%     Cardiovascular:  Asymptomatic Hypertensive Urgency  Resistant Hypertension  -BP stable.  Continue antihypertensive medications.  -Home antihypertensives resumed with updated dosage changes which include increasing Amlodipine and Doxazosin  -Consult cardiology, patient normally follows with Dr. Sanchez.  -Cardiac diet  -Give 4 GM IV Mag x 1 now  -UDS, doubt drug use but need to rule out  -Monitor and replace electrolytes as needed  -Please document hourly vital signs  -Continuous cardiac monitoring       GI:  GERD  -Continue home PPI     Renal/Volume Status (Intra & Extravascular):  -Monitor renal function and electrolyte.  Currently stable    Endocrine  -Check TSH with reflex to T4     Infectious Disease:  -CBC completely normal no sign of infection     Heme/Onc:  -CBC as needed     OBGYN/MSK:  -Activity as tolerated with assistance  -PT/OT     Ethics/Code Status:  Full code     :  DVT Prophylaxis: Lovenox  GI Prophylaxis: PPI  Bowel Regimen: N/A  Diet: Cardiac  CVC: N/A  Edyta: N/A  Pierre: N/A  Restraints: N/A  Dispo: Transfer to tele vs homegoing       Jameson France [facility]  Adult Gerontology Acute Care Nurse Practitioner  [facility] [place]/Pulmonary and Critical Care  [facility]  [phone]

## 2024-03-16 NOTE — H&P
[facility] Critical Care Medicine       Date:  3/15/2024  Patient:  Colt Lewsi  YOB: 1970  MRN:  [id number]   Admit Date:  3/15/2024  ========================================================================================================    Chief Complaint   Patient presents with    Hypertension     Pt states when his home health aide came to change his bandages on his legs and took his BP it was high and she thought he should come get it checked out. Pt denies headache, dizziness, SOB, chest pain.         History of Present Illness:  Colt Lewis is a 53 y.o. year old  male patient with significant past Medical History listed below including resistant hypertension, morbid obesity and intellectual disability, initially presented to the ED per recommendation from home health aide as his SBP was reading in the 200s.  Home health nurse told ED provider he did not miss any doses of his medications.  According to documentation patient just recently started following Dr. Sanchez from [facility] and his Amlodipine and Doxazosin were recently increased.  Patient has documented history of resistant hypertension.  Patient also notes lower extremity edema which is chronic given venous insufficiency, bilateral lower extremities are wrapped from the knees to the feet for wounds secondary to venous insufficiency.  Patient denies vision changes, headache or any other focal neurodeficit or changes.  Patient also denies chest pain or shortness of breath.    SBP consistently in the 200s while in ED despite receiving 20 mg IV labetalol x 2, 10 mg IV hydralazine x 2, Catapres 0.1 mg x 2.  Patient stated he had already taken his antihypertensives in the morning.  Patient also has clonidine patch on to left chest wall that was changed on 3/14.  All lab work is completely unremarkable.  EKG was normal sinus rhythm.  Chest x-ray negative for acute process.      Interval ICU Events:  Admitted to ICU for  resistant hypertension/asymptomatic hypertensive urgency with plan to start on nicardipine drip however when patient arrived to ICU BP was 170's/80's HR 80's.  Nicardipine was never started, told RN to hold off on starting and we will try other measures as BP is improving and I do not want to drop his blood pressure too quickly.    Medical History:  Past Medical History:   Diagnosis Date    Cholecystitis, acute     Gastroesophageal reflux disease 02/11/2009    Hypertension     Intellectual disability 05/10/2018    Mixed hyperlipidemia 12/13/2023    Morbid obesity with body mass index (BMI) of 40.0 or higher (CMS/HCC)     Resistant hypertension     Venous (peripheral) insufficiency 12/13/2023    Formatting of this note might be different from the original. chronic swelling of LE; w venous stasis dermatitis     Past Surgical History:   Procedure Laterality Date    CHOLECYSTECTOMY  2020    COLONOSCOPY  07/13/2023     (Not in a hospital admission)    Penicillins and Sulfamethoxazole-trimethoprim  Social History     Tobacco Use    Smoking status: Never    Smokeless tobacco: Never   Vaping Use    Vaping Use: Never used   Substance Use Topics    Alcohol use: Never    Drug use: Never     Family History   Problem Relation Name Age of Onset    Hypertension Mother      Colon cancer Mother      Hypertension Father         Hospital Medications:    niCARdipine, 0-15 mg/hr          Current Facility-Administered Medications:     [START ON 3/16/2024] amLODIPine (Norvasc) tablet 10 mg, 10 mg, oral, Daily, Amy E Pentito, APRN-CNP    buPROPion SR (Wellbutrin SR) 12 hr tablet 150 mg, 150 mg, oral, BID, Amy E Pentito, APRN-CNP    [START ON 3/16/2024] calcium-magnesium-copper-zinc tablet 1 tablet, 1 tablet, oral, Daily, Amy E Pentito, APRN-CNP    [START ON 3/16/2024] cholecalciferol (Vitamin D-3) tablet 1,000 Units, 1,000 Units, oral, Daily, Amy E Pentito, APRN-CNP    [START ON 3/16/2024] cloNIDine (Catapres-TTS) 0.3 mg/24 hr  patch 1 patch, 1 patch, transdermal, Weekly, Amy Peace, APRN-CNP    [START ON 3/16/2024] doxazosin (Cardura) tablet 2 mg, 2 mg, oral, BID, Amy E Nata, APRN-CNP    enoxaparin (Lovenox) syringe 40 mg, 40 mg, subcutaneous, q12h NANNETTE, Amy E Nata, APRN-CNP    [START ON 3/16/2024] escitalopram (Lexapro) tablet 5 mg, 5 mg, oral, Daily, Amy E Nata, APRN-CNP    magnesium sulfate IV 4 g, 4 g, intravenous, Once, Amy QUITA Peace, APRN-CNP    [START ON 3/16/2024] metoprolol succinate XL (Toprol-XL) 24 hr tablet 100 mg, 100 mg, oral, Daily, Amy E Nata, APRN-CNP    [START ON 3/16/2024] naltrexone (Depade) tablet 50 mg, 50 mg, oral, Daily, Amy Peace, APRN-CNP    niCARdipine (Cardene) 40 mg in sodium chloride 200 mL (0.2 mg/mL) infusion (premix), 0-15 mg/hr, intravenous, Continuous, Amy E Nata, APRN-CNP    [START ON 3/16/2024] pantoprazole (ProtoNix) EC tablet 40 mg, 40 mg, oral, Daily before breakfast, Amy E Nata, APRN-CNP    potassium chloride CR (Klor-Con) ER tablet 10 mEq, 10 mEq, oral, BID, Amy Peace, APRN-CNP    [START ON 3/16/2024] valsartan 320 mg, hydroCHLOROthiazide 25 mg for Diovan HCT, , oral, Daily, Amy E Nata, APRN-CNP    Current Outpatient Medications:     amLODIPine (Norvasc) 10 mg tablet, Take 1 tablet (10 mg) by mouth once daily., Disp: 90 tablet, Rfl: 1    buPROPion SR (Wellbutrin SR) 150 mg 12 hr tablet, Take 1 tablet (150 mg) by mouth 2 times a day., Disp: , Rfl:     calcium carb/mag oxide/Cu/zinc (calcium-magnesium-copper-zinc) tablet, Take 1 tablet by mouth once daily., Disp: , Rfl:     cholecalciferol (Vitamin D-3) 25 MCG (1000 UT) tablet, Take 1 tablet (25 mcg) by mouth once daily., Disp: , Rfl:     cloNIDine (Catapres-TTS) 0.3 mg/24 hr patch, Place 1 patch on the skin 1 (one) time per week., Disp: 8 patch, Rfl: 1    doxazosin (Cardura) 2 mg tablet, Take 1 tablet (2 mg) by mouth 2 times a day., Disp: 180 tablet, Rfl: 3    escitalopram (Lexapro) 5  "mg tablet, Take 1 tablet (5 mg) by mouth once daily., Disp: , Rfl:     metoprolol succinate XL (Toprol-XL) 100 mg 24 hr tablet, Take 1 tablet (100 mg) by mouth once daily., Disp: 90 tablet, Rfl: 1    miscellaneous medical supply (Blood Pressure Cuff) misc, 1 Device once daily., Disp: 1 each, Rfl: 0    naltrexone (Depade) 50 mg tablet, Take 1 tablet (50 mg) by mouth once daily., Disp: , Rfl:     omeprazole (PriLOSEC) 20 mg DR capsule, Take 1 capsule (20 mg) by mouth once daily., Disp: , Rfl:     potassium chloride CR 10 mEq ER tablet, Take 1 tablet (10 mEq) by mouth 2 times a day., Disp: , Rfl:     valsartan-hydrochlorothiazide (Diovan-HCT) 320-25 mg tablet, Take 1 tablet by mouth once daily., Disp: 90 tablet, Rfl: 1    Review of Systems:  14 point review of systems was completed and negative except for those specially mention in my HPI    Physical Exam:    Heart Rate:  []   Temperature:  [36.7 °C (98.1 °F)]   Respirations:  [16-23]   BP: (199-247)/()   Height:  [170.2 cm (5' 7\")]   Weight:  [134 kg (295 lb)]   Pulse Ox:  [93 %-97 %]     Physical Exam  Constitutional:       Appearance: He is obese.   HENT:      Head: Normocephalic.      Right Ear: External ear normal.      Left Ear: External ear normal.      Nose: Nose normal.      Mouth/Throat:      Mouth: Mucous membranes are moist.      Pharynx: Oropharynx is clear.   Eyes:      Extraocular Movements: Extraocular movements intact.      Conjunctiva/sclera: Conjunctivae normal.   Cardiovascular:      Rate and Rhythm: Normal rate and regular rhythm.      Heart sounds: No murmur heard.  Pulmonary:      Effort: Pulmonary effort is normal.      Breath sounds: Normal breath sounds.   Abdominal:      General: Bowel sounds are normal.      Palpations: Abdomen is soft.      Comments: Soft protruding umbilical hernia   Musculoskeletal:         General: Swelling present.   Skin:     General: Skin is warm and dry.      Comments: BLE bilateral dressing with ace wrap " compression   Neurological:      General: No focal deficit present.      Mental Status: He is alert.      Comments: Intellectual disability   Psychiatric:         Behavior: Behavior normal.         Objective:    I have reviewed all medications, laboratory results, and imaging pertinent for today's encounter.    Results for orders placed or performed during the hospital encounter of 03/15/24 (from the past 24 hour(s))   CBC and Auto Differential   Result Value Ref Range    WBC 7.2 4.4 - 11.3 x10*3/uL    nRBC 0.0 0.0 - 0.0 /100 WBCs    RBC 5.14 4.50 - 5.90 x10*6/uL    Hemoglobin 14.3 13.5 - 17.5 g/dL    Hematocrit 43.0 41.0 - 52.0 %    MCV 84 80 - 100 fL    MCH 27.8 26.0 - 34.0 pg    MCHC 33.3 32.0 - 36.0 g/dL    RDW 13.7 11.5 - 14.5 %    Platelets 250 150 - 450 x10*3/uL    Neutrophils % 67.1 40.0 - 80.0 %    Immature Granulocytes %, Automated 0.3 0.0 - 0.9 %    Lymphocytes % 22.1 13.0 - 44.0 %    Monocytes % 7.5 2.0 - 10.0 %    Eosinophils % 2.6 0.0 - 6.0 %    Basophils % 0.4 0.0 - 2.0 %    Neutrophils Absolute 4.84 1.20 - 7.70 x10*3/uL    Immature Granulocytes Absolute, Automated 0.02 0.00 - 0.70 x10*3/uL    Lymphocytes Absolute 1.59 1.20 - 4.80 x10*3/uL    Monocytes Absolute 0.54 0.10 - 1.00 x10*3/uL    Eosinophils Absolute 0.19 0.00 - 0.70 x10*3/uL    Basophils Absolute 0.03 0.00 - 0.10 x10*3/uL   Comprehensive metabolic panel   Result Value Ref Range    Glucose 96 74 - 99 mg/dL    Sodium 139 136 - 145 mmol/L    Potassium 3.7 3.5 - 5.3 mmol/L    Chloride 101 98 - 107 mmol/L    Bicarbonate 31 21 - 32 mmol/L    Anion Gap 11 10 - 20 mmol/L    Urea Nitrogen 12 6 - 23 mg/dL    Creatinine 0.75 0.50 - 1.30 mg/dL    eGFR >90 >60 mL/min/1.73m*2    Calcium 9.3 8.6 - 10.3 mg/dL    Albumin 4.1 3.4 - 5.0 g/dL    Alkaline Phosphatase 77 33 - 120 U/L    Total Protein 7.8 6.4 - 8.2 g/dL    AST 13 9 - 39 U/L    Bilirubin, Total 0.3 0.0 - 1.2 mg/dL    ALT 12 10 - 52 U/L   Sars-CoV-2 PCR   Result Value Ref Range    Coronavirus 2019,  PCR Not Detected Not Detected   Influenza A, and B PCR   Result Value Ref Range    Flu A Result Not Detected Not Detected    Flu B Result Not Detected Not Detected   Urinalysis with Reflex Culture and Microscopic   Result Value Ref Range    Color, Urine Colorless (N) Straw, Yellow    Appearance, Urine Clear Clear    Specific Gravity, Urine 1.004 (N) 1.005 - 1.035    pH, Urine 8.0 5.0, 5.5, 6.0, 6.5, 7.0, 7.5, 8.0    Protein, Urine NEGATIVE NEGATIVE mg/dL    Glucose, Urine NEGATIVE NEGATIVE mg/dL    Blood, Urine NEGATIVE NEGATIVE    Ketones, Urine NEGATIVE NEGATIVE mg/dL    Bilirubin, Urine NEGATIVE NEGATIVE    Urobilinogen, Urine <2.0 <2.0 mg/dL    Nitrite, Urine NEGATIVE NEGATIVE    Leukocyte Esterase, Urine NEGATIVE NEGATIVE   ECG 12 lead   Result Value Ref Range    Ventricular Rate 99 BPM    Atrial Rate 99 BPM    KY Interval 204 ms    QRS Duration 90 ms    QT Interval 382 ms    QTC Calculation(Bazett) 490 ms    P Axis 35 degrees    R Axis 39 degrees    T Axis 45 degrees    QRS Count 16 beats    Q Onset 220 ms    P Onset 118 ms    P Offset 191 ms    T Offset 411 ms    QTC Fredericia 451 ms      ECG 12 lead  Normal sinus rhythm  Nonspecific T wave abnormality  Abnormal ECG  When compared with ECG of 31-MAR-2023 16:10,  Previous ECG has undetermined rhythm, needs review  Non-specific change in ST segment in Anterior leads  XR chest 1 view  Narrative: Interpreted By:  Peng Davis,   STUDY:  XR CHEST 1 VIEW;  3/15/2024 3:49 pm      INDICATION:  Signs/Symptoms:Elevated blood pressure.      COMPARISON:  03/31/2023.      ACCESSION NUMBER(S):  [id number]      ORDERING CLINICIAN:  MARII PALAFOX      FINDINGS:      The cardiac silhouette is unremarkable. Costophrenic angles are  sharp. Lungs are clear. The trachea is midline. There is no  pneumothorax. No acute osseous abnormality is seen.      Impression: 1.  No active cardiopulmonary process.          Signed by: Peng Davis 3/15/2024 3:58 PM  Dictation workstation:    [id number]       No intake or output data in the 24 hours ending 03/15/24 6970      Assessment/Plan:    I am currently managing this critically ill patient for the following problems:    Asymptomatic Hypertensive Urgency  Resistant Hypertension    Neuro/Psych/Pain Ctrl/Sedation:  -Neurochecks every shift    Respiratory/ENT:  -Supplemental oxygen as needed to maintain SpO2 greater than 92%    Cardiovascular:  Asymptomatic Hypertensive Urgency  Resistant Hypertension  -Hold off on nicardipine drip as BP is already improving and I do not want to drop it too quickly  -If SBP goes back up into the 200s start Nicardipine drip  -Give additional dose of Norvasc now as well as hydralazine  -Home antihypertensives resumed with updated dosage changes which include increasing Amlodipine and Doxazosin  -Consult cardiology  -Cardiac diet  -Give 4 GM IV Mag x 1 now  -UDS, doubt drug use but need to rule out  -Monitor and replace electrolytes as needed  -Please document hourly vital signs  -Continuous cardiac monitoring    GI:  GERD  -Continue home PPI    Renal/Volume Status (Intra & Extravascular):  -Monitor renal function and electrolytes    Endocrine  -Check TSH with reflex to    Infectious Disease:  -CBC completely normal no sign of infection    Heme/Onc:  -CBC as needed    OBGYN/MSK:  -Activity as tolerated with assistance    Ethics/Code Status:  Full code    :  DVT Prophylaxis: Lovenox  GI Prophylaxis: PPI  Bowel Regimen: N/A  Diet: Cardiac  CVC: N/A  Edyta: N/A  Pierre: N/A  Restraints: N/A  Dispo: ICU    Critical Care Time: 35 minutes    STIVEN Salmeron-CNP  Critical Care

## 2024-03-16 NOTE — NURSING NOTE
"Report called to Of[facility] on 11S. Ofelia verbalized \"patient going to 1122. Room is not ready but I will call when it is clean\".  "

## 2024-03-16 NOTE — CONSULTS
Inpatient consult to cardiology  Consult performed by: Sudeep Sanchez MD  Consult ordered by: VIVIAN Lester  Reason for consult: Hypertensive emergency      Cardiology Consult Note      Date:   3/16/2024  Patient name:  Colt Higuera  Date of admission:  3/15/2024  2:34 PM  MRN:   [id number]  YOB: 1970  Time of Consult:  10:30 AM    Consulting Cardiologist: Dr. Sudeep Sanchez     Primary Cardiologist:  Dr. Sudeep Sanchez        History of Present Illness:      Colt Higuera is a 54-year-old male patient being seen at the request of VIVIAN Sky for recommendations regarding resistant hypertension.  She had recent initial evaluation in the office February 9, 2024.  He does not have any history of atherosclerotic heart or valvular heart disease.  He has a history of hypertension dating back more than 20 years.  He has been treated primarily at [facility].  Most recently he has been maintained on amlodipine, Catapres TTS patch, Toprol-XL, and valsartan HCT.  He has a history of hyperlipidemia.  No history of diabetes mellitus.  He does not smoke.  He has a history of morbid obesity ,chronic venous insufficiency, and chronic edema.  He has a home health nurse who comes out 3 days a week to wrap his legs.  He notes that several family members developed hypertension at young ages.  He does not specifically recall having had any evaluation for secondary hypertension.  He has been hospitalized on a few occasions with hypertensive urgency.  He apparently has not very compliant with his medications or with diet.  He admittedly is very sedentary.  He ambulates with use of a walker.  An echocardiogram May 1, 2023 showed an estimated LV ejection fraction 60 to 65%.  Trivial tricuspid regurgitation.  He has a history of intellectual disability.  T    He was placed on doxazosin 1 mg daily and this was recently increased to 2 mg twice daily.  A recent renal arterial  ultrasound was negative for renal artery stenosis.  Fractionated metanephrines, plasma renin aldosterone, and plasma aldosterone concentrations were normal.     He was brought to emergency department yesterday due to consistent systolic blood pressure readings in the low 200s.  His blood pressure in the ED remained 200s despite receiving IV labetalol 20 mg x 2, IV hydralazine 10 mg IV x 2, and Catapres 0.1 mg x 2.  His vital signs were otherwise stable.  CMP and CBC normal.  TSH 1.20.  Magnesium 2.5.  Chest x-ray did not show any active disease.  EKG shows normal sinus rhythm with incomplete right bundle branch block and minor nonspecific ST-T wave changes.    Patient was ordered to be placed on intravenous nicardipine drip and admitted to the ICU.  When patient arrived his blood pressure had improved to the 170 systolic.  Nicardipine drip was not started.  Patient was placed on increased doses of amlodipine and doxazosin.  He currently is resting comfortably at the side of the bed.  He denies any chest pain or shortness of breath.  He denies any orthopnea, PND, or increasing peripheral edema.  He denies any palpitations, lightheadedness, near-syncope, or syncope.  He denies any fever, chills, or cough.  He denies any nausea, vomiting, or diaphoresis.  He denies any hemoptysis, hematemesis, melena, or hematochezia.      Allergies:     Allergies   Allergen Reactions    Penicillins Hives    Sulfamethoxazole-Trimethoprim Rash         Past Medical History:     Past Medical History:   Diagnosis Date    Cholecystitis, acute     Gastroesophageal reflux disease 02/11/2009    Hypertension     Intellectual disability 05/10/2018    Mixed hyperlipidemia 12/13/2023    Morbid obesity with body mass index (BMI) of 40.0 or higher (CMS/[facility])     Resistant hypertension     Venous (peripheral) insufficiency 12/13/2023    Formatting of this note might be different from the original. chronic swelling of LE; w venous stasis dermatitis        Past Surgical History:     Past Surgical History:   Procedure Laterality Date    CHOLECYSTECTOMY  2020    COLONOSCOPY  07/13/2023       Family History:     Family History   Problem Relation Name Age of Onset    Hypertension Mother      Colon cancer Mother      Hypertension Father         Social History:     Social History     Tobacco Use    Smoking status: Never    Smokeless tobacco: Never   Vaping Use    Vaping Use: Never used   Substance Use Topics    Alcohol use: Never    Drug use: Never       CURRENT HOSPITAL MEDICATIONS    amLODIPine, 10 mg, oral, Daily  buPROPion SR, 150 mg, oral, BID  cholecalciferol, 1,000 Units, oral, Daily  [START ON 3/21/2024] cloNIDine, 1 patch, transdermal, Weekly  doxazosin, 2 mg, oral, BID  enoxaparin, 40 mg, subcutaneous, q12h NANNETTE  escitalopram, 5 mg, oral, Daily  metoprolol succinate XL, 100 mg, oral, Daily  naltrexone, 50 mg, oral, Daily  pantoprazole, 40 mg, oral, Daily  potassium chloride CR, 10 mEq, oral, BID  valsartan 320 mg, hydroCHLOROthiazide 25 mg for Diovan HCT, , oral, Daily         Current Outpatient Medications   Medication Instructions    amLODIPine (NORVASC) 10 mg, oral, Daily    buPROPion SR (WELLBUTRIN SR) 150 mg, oral, 2 times daily    calcium carb/mag oxide/Cu/zinc (calcium-magnesium-copper-zinc) tablet 1 tablet, oral, Daily RT    cholecalciferol (Vitamin D-3) 25 MCG (1000 UT) tablet 1 tablet, oral, Daily    cloNIDine (Catapres-TTS) 0.3 mg/24 hr patch 1 patch, transdermal, Weekly    doxazosin (CARDURA) 2 mg, oral, 2 times daily (0900,1400)    escitalopram (LEXAPRO) 5 mg, oral, Daily    metoprolol succinate XL (TOPROL-XL) 100 mg, oral, Daily    miscellaneous medical supply (Blood Pressure Cuff) misc 1 Device, miscellaneous, Daily    naltrexone (DEPADE) 50 mg, oral, Daily    omeprazole (PriLOSEC) 20 mg DR capsule 1 capsule, oral, Daily    potassium chloride CR 10 mEq ER tablet 10 mEq, oral, 2 times daily    valsartan-hydrochlorothiazide (Diovan-HCT) 320-25 mg  tablet 1 tablet, oral, Daily        Review of Systems:      12 point review of systems was obtained in detail and is negative other than that detailed above.      Vital Signs:     Vitals:    03/16/24 0800 03/16/24 0900 03/16/24 1000 03/16/24 1007   BP: 168/88 168/85  153/90   BP Location:    Left arm   Patient Position:    Sitting   Pulse: 84 80 82 89   Resp: 20 19 25 24   Temp:       TempSrc:       SpO2: 96% 98% 97%    Weight:       Height:           Intake/Output Summary (Last 24 hours) at 3/16/2024 1030  Last data filed at 3/16/2024 0600  Gross per 24 hour   Intake 460 ml   Output 850 ml   Net -390 ml       Wt Readings from Last 4 Encounters:   03/16/24 134 kg (294 lb 12.1 oz)   12/13/23 138 kg (304 lb 6.4 oz)   07/31/20 133 kg (294 lb)       Physical Examination:     GENERAL APPEARANCE: Well developed, well nourished, in no acute distress.  CHEST: Symmetric and non-tender.  INTEGUMENT: Skin warm and dry, without gross excoriationis or lesions.  HEENT: No gross abnormalities of conjunctiva, teeth, gums, oral mucosa  NECK: Supple, no JVD, no bruit. Thyroid not palpable. Carotid upstrokes normal.  NEURO/PSHCY: Alert and oriented x3; appropriate behavior and responses and responses, grossly normal cerebellar function with normal balance and coordination  LUNGS: Clear to auscultation bilaterally; normal respiratory effort.  HEART: Rate and rhythm regular with no evident murmur; no gallop appreciated. There are no rubs, clicks or heaves. PMI nondisplaced.  ABDOMEN: Soft, nontender, no palpable hepatosplenomegaly, no mases, no bruits. Abdominal aorta not noted to be enlarged.  MUSCULOSKELETAL: Ambulatory with normal tandem gait.  EXTREMITIES: Warm with good color, no clubbing or cyanois. There is no edema noted.  PERIPHERAL VASCULAR: Pulses present and equally palpable; 2+ throughout. No femoral bruits.      Lab:     CBC:   Lab Results   Component Value Date    WBC 9.5 03/16/2024    RBC 4.84 03/16/2024    HGB 13.4 (L)  03/16/2024    HCT 40.7 (L) 03/16/2024     03/16/2024        CMP:    Lab Results   Component Value Date     03/16/2024    K 3.8 03/16/2024     03/16/2024    CO2 27 03/16/2024    BUN 11 03/16/2024    CREATININE 0.60 03/16/2024    GLUCOSE 144 (H) 03/16/2024    CALCIUM 8.8 03/16/2024       Magnesium:    Lab Results   Component Value Date    MG 2.54 (H) 03/16/2024     TSH:    Lab Results   Component Value Date    TSH 1.20 03/15/2024       BNP:   Lab Results   Component Value Date    BNP 66 01/12/2023        BMP:  Lab Results   Component Value Date     03/16/2024     03/15/2024    K 3.8 03/16/2024    K 3.7 03/15/2024     03/16/2024     03/15/2024    CO2 27 03/16/2024    CO2 31 03/15/2024    BUN 11 03/16/2024    BUN 12 03/15/2024    CREATININE 0.60 03/16/2024    CREATININE 0.75 03/15/2024       CBC:  Lab Results   Component Value Date    WBC 9.5 03/16/2024    WBC 7.2 03/15/2024    RBC 4.84 03/16/2024    RBC 5.14 03/15/2024    HGB 13.4 (L) 03/16/2024    HGB 14.3 03/15/2024    HCT 40.7 (L) 03/16/2024    HCT 43.0 03/15/2024    MCV 84 03/16/2024    MCV 84 03/15/2024    MCH 27.7 03/16/2024    MCH 27.8 03/15/2024    MCHC 32.9 03/16/2024    MCHC 33.3 03/15/2024    RDW 14.0 03/16/2024    RDW 13.7 03/15/2024     03/16/2024     03/15/2024       Cardiac Enzymes:    Lab Results   Component Value Date    TROPHS 10 03/31/2023    TROPHS 10 03/31/2023    TROPHS 8 03/31/2023       Hepatic Function Panel:    Lab Results   Component Value Date    ALKPHOS 77 03/15/2024    ALT 12 03/15/2024    AST 13 03/15/2024    PROT 7.8 03/15/2024    BILITOT 0.3 03/15/2024       Diagnostic Studies:     ECG 12 lead  Result Date: 3/15/2024    Normal sinus rhythm Nonspecific T wave abnormality Abnormal ECG When compared with ECG of 31-MAR-2023 16:10, Previous ECG has undetermined rhythm, needs review Non-specific change in ST segment in Anterior leads    Radiology:     XR chest 1 view   Final Result   1.   No active cardiopulmonary process.             Signed by: Peng Davis 3/15/2024 3:58 PM   Dictation workstation:   [id number]          Problem List:     Patient Active Problem List   Diagnosis    Acute cholecystitis    Elevated glucose    Essential hypertension, benign    Gastroesophageal reflux disease    Intellectual disability    Mixed hyperlipidemia    Morbid obesity (CMS/HCC)    Varicose veins of lower extremities with ulcer and inflammation (CMS/HCC)    Venous (peripheral) insufficiency    Venous stasis    Asymptomatic hypertensive urgency    Hypertensive urgency       Assessment:     1.  Resistant hypertension with hypertensive urgency.  2.  Chronic venous insufficiency.  3.  No actual disability.  4.  Mixed hyperlipidemia.  5.  Morbid obesity.      Plan:     Continue home medications including amlodipine 10 mg daily, Catapres TTS 3 patch transdermal weekly, Toprol- mg daily, and valsartan /25 mg daily.  Patient was taking doxazosin 1 mg daily and was recently advised to increase this to 2 mg twice daily.  Can titrate this up to as high as 8 mg twice daily as needed.    Patient was strongly advised on the importance of being compliant with his diet and restricting salt intake.    Okay from cardiac standpoint to discharge home and follow-up as scheduled.    Thank you for the opportunity to participate in the care of your patient.  Please do not hesitate to call if you have any questions.    Electronically signed by Sudeep Sanchez MD, on 3/16/2024 at 10:30 AM

## 2024-03-16 NOTE — NURSING NOTE
"Amisha pt's cousin and POA called for update. Dr Sanchez at bedside and Amisha was able to hear what the plan was from Dr Sanchez's standpoint. Dr Sanchez discussed he may increase some of his medications. There was talk of pt possibly discharging at some point today. Amisha did express her concern about pt discharging with changing of medication. Amisha did verbalize that she would like patient to stay another day to assess how patient would respond to the change in medication especially due to patient's noncompliance with medication and home health nurse would not be back until Monday. Explained could not guarantee keep until Monday but would talk with the Dr Kruse today and explain her concern about discharge. Maybe keep overnight and then maybe she can call tomorrow when he gets ready to discharge for instructions as well since she lives several hours away. She verbalized \" her appreciating that\". I also discussed getting the POA papers faxed. Amisha verbalized \"I don't have a fax machine but I had sent them to the hospital before. Next time I am up I will bring them up so they can be put in his chart'.  Spoke with Dr Kruse and LAURENT France NP regarding family concern.   "

## 2024-03-16 NOTE — CARE PLAN
Problem: Pain - Adult  Goal: Verbalizes/displays adequate comfort level or baseline comfort level  3/16/2024 1728 by Shaista Kaye RN  Outcome: Progressing  3/16/2024 1622 by Shaista Kaye RN  Flowsheets (Taken 3/16/2024 1622)  Verbalizes/displays adequate comfort level or baseline comfort level:   Encourage patient to monitor pain and request assistance   Assess pain using appropriate pain scale   Administer analgesics based on type and severity of pain and evaluate response   Implement non-pharmacological measures as appropriate and evaluate response   Consider cultural and social influences on pain and pain management   Notify Licensed Independent Practitioner if interventions unsuccessful or patient reports new pain     Problem: Safety - Adult  Goal: Free from fall injury  3/16/2024 1728 by Shaista Kaye RN  Outcome: Progressing  3/16/2024 1622 by Shaista Kaye RN  Flowsheets (Taken 3/16/2024 1622)  Free from fall injury: Instruct family/caregiver on patient safety     Problem: Discharge Planning  Goal: Discharge to home or other facility with appropriate resources  3/16/2024 1728 by Shaista Kaye RN  Outcome: Progressing  3/16/2024 1622 by Shaista Kaye RN  Flowsheets (Taken 3/16/2024 1622)  Discharge to home or other facility with appropriate resources:   Identify barriers to discharge with patient and caregiver   Arrange for needed discharge resources and transportation as appropriate   Identify discharge learning needs (meds, wound care, etc)   Refer to discharge planning if patient needs post-hospital services based on physician order or complex needs related to functional status, cognitive ability or social support system     Problem: Chronic Conditions and Co-morbidities  Goal: Patient's chronic conditions and co-morbidity symptoms are monitored and maintained or improved  3/16/2024 1728 by Shaista Kaye RN  Outcome: Progressing  3/16/2024 1622 by Shaista Kaye RN  Flowsheets  (Taken 3/16/2024 1622)  Care Plan - Patient's Chronic Conditions and Co-Morbidity Symptoms are Monitored and Maintained or Improved:   Monitor and assess patient's chronic conditions and comorbid symptoms for stability, deterioration, or improvement   Collaborate with multidisciplinary team to address chronic and comorbid conditions and prevent exacerbation or deterioration   Update acute care plan with appropriate goals if chronic or comorbid symptoms are exacerbated and prevent overall improvement and discharge     Problem: Skin  Goal: Participates in plan/prevention/treatment measures  3/16/2024 1728 by Shaista Kaye RN  Outcome: Progressing  3/16/2024 1622 by Shaista Kaye RN  Flowsheets (Taken 3/16/2024 1622)  Participates in plan/prevention/treatment measures:   Discuss with provider PT/OT consult   Elevate heels   Increase activity/out of bed for meals  Goal: Prevent/manage excess moisture  3/16/2024 1728 by Shaista Kaye RN  Outcome: Progressing  3/16/2024 1622 by Shaista Kaye RN  Flowsheets (Taken 3/16/2024 1622)  Prevent/manage excess moisture:   Cleanse incontinence/protect with barrier cream   Moisturize dry skin  Goal: Prevent/minimize sheer/friction injuries  3/16/2024 1728 by Shaista Kaye RN  Outcome: Progressing  3/16/2024 1622 by Shaista Kaye RN  Flowsheets (Taken 3/16/2024 1622)  Prevent/minimize sheer/friction injuries:   Complete micro-shifts as needed if patient unable. Adjust patient position to relieve pressure points, not a full turn   Increase activity/out of bed for meals   Use pull sheet   HOB 30 degrees or less   Turn/reposition every 2 hours/use positioning/transfer devices  Goal: Promote/optimize nutrition  3/16/2024 1728 by Shaista Kaye RN  Outcome: Progressing  3/16/2024 1622 by Shaista Kaye RN  Flowsheets (Taken 3/16/2024 1622)  Promote/optimize nutrition: Offer water/supplements/favorite foods     Problem: Fall/Injury  Goal: Not fall by end of  shift  Outcome: Progressing  Goal: Be free from injury by end of the shift  Outcome: Progressing  Goal: Verbalize understanding of personal risk factors for fall in the hospital  Outcome: Progressing  Goal: Verbalize understanding of risk factor reduction measures to prevent injury from fall in the home  Outcome: Progressing  Goal: Use assistive devices by end of the shift  Outcome: Progressing  Goal: Pace activities to prevent fatigue by end of the shift  Outcome: Progressing   The patient's goals for the shift include      The clinical goals for the shift include Maintain sbp less than 140    Over the shift, the patient did make progress toward care plan goals.

## 2024-03-17 VITALS
WEIGHT: 297.62 LBS | RESPIRATION RATE: 20 BRPM | OXYGEN SATURATION: 94 % | HEART RATE: 62 BPM | SYSTOLIC BLOOD PRESSURE: 103 MMHG | BODY MASS INDEX: 46.71 KG/M2 | DIASTOLIC BLOOD PRESSURE: 59 MMHG | TEMPERATURE: 97 F | HEIGHT: 67 IN

## 2024-03-17 LAB
ANION GAP SERPL CALC-SCNC: 10 MMOL/L (ref 10–20)
BUN SERPL-MCNC: 23 MG/DL (ref 6–23)
CALCIUM SERPL-MCNC: 8.9 MG/DL (ref 8.6–10.3)
CHLORIDE SERPL-SCNC: 101 MMOL/L (ref 98–107)
CO2 SERPL-SCNC: 28 MMOL/L (ref 21–32)
CREAT SERPL-MCNC: 0.93 MG/DL (ref 0.5–1.3)
EGFRCR SERPLBLD CKD-EPI 2021: >90 ML/MIN/1.73M*2
ERYTHROCYTE [DISTWIDTH] IN BLOOD BY AUTOMATED COUNT: 14.2 % (ref 11.5–14.5)
GLUCOSE SERPL-MCNC: 115 MG/DL (ref 74–99)
HCT VFR BLD AUTO: 37.9 % (ref 41–52)
HGB BLD-MCNC: 12.6 G/DL (ref 13.5–17.5)
HOLD SPECIMEN: NORMAL
MAGNESIUM SERPL-MCNC: 2.08 MG/DL (ref 1.6–2.4)
MCH RBC QN AUTO: 28 PG (ref 26–34)
MCHC RBC AUTO-ENTMCNC: 33.2 G/DL (ref 32–36)
MCV RBC AUTO: 84 FL (ref 80–100)
NRBC BLD-RTO: 0 /100 WBCS (ref 0–0)
PLATELET # BLD AUTO: 232 X10*3/UL (ref 150–450)
POTASSIUM SERPL-SCNC: 3.8 MMOL/L (ref 3.5–5.3)
RBC # BLD AUTO: 4.5 X10*6/UL (ref 4.5–5.9)
SODIUM SERPL-SCNC: 135 MMOL/L (ref 136–145)
WBC # BLD AUTO: 6.9 X10*3/UL (ref 4.4–11.3)

## 2024-03-17 PROCEDURE — 99239 HOSP IP/OBS DSCHRG MGMT >30: CPT | Performed by: STUDENT IN AN ORGANIZED HEALTH CARE EDUCATION/TRAINING PROGRAM

## 2024-03-17 PROCEDURE — 99231 SBSQ HOSP IP/OBS SF/LOW 25: CPT | Performed by: INTERNAL MEDICINE

## 2024-03-17 PROCEDURE — 2500000004 HC RX 250 GENERAL PHARMACY W/ HCPCS (ALT 636 FOR OP/ED): Performed by: NURSE PRACTITIONER

## 2024-03-17 PROCEDURE — 83735 ASSAY OF MAGNESIUM: CPT | Performed by: NURSE PRACTITIONER

## 2024-03-17 PROCEDURE — 80048 BASIC METABOLIC PNL TOTAL CA: CPT | Performed by: NURSE PRACTITIONER

## 2024-03-17 PROCEDURE — 2500000002 HC RX 250 W HCPCS SELF ADMINISTERED DRUGS (ALT 637 FOR MEDICARE OP, ALT 636 FOR OP/ED): Performed by: NURSE PRACTITIONER

## 2024-03-17 PROCEDURE — 2500000001 HC RX 250 WO HCPCS SELF ADMINISTERED DRUGS (ALT 637 FOR MEDICARE OP): Performed by: NURSE PRACTITIONER

## 2024-03-17 PROCEDURE — 97161 PT EVAL LOW COMPLEX 20 MIN: CPT | Mod: GP

## 2024-03-17 PROCEDURE — 36415 COLL VENOUS BLD VENIPUNCTURE: CPT | Performed by: NURSE PRACTITIONER

## 2024-03-17 PROCEDURE — 85027 COMPLETE CBC AUTOMATED: CPT | Performed by: NURSE PRACTITIONER

## 2024-03-17 RX ADMIN — VALSARTAN: 160 TABLET, FILM COATED ORAL at 09:02

## 2024-03-17 RX ADMIN — METOPROLOL SUCCINATE 100 MG: 50 TABLET, EXTENDED RELEASE ORAL at 09:03

## 2024-03-17 RX ADMIN — DOXAZOSIN 2 MG: 1 TABLET ORAL at 09:03

## 2024-03-17 RX ADMIN — ENOXAPARIN SODIUM 40 MG: 40 INJECTION SUBCUTANEOUS at 09:02

## 2024-03-17 RX ADMIN — NALTREXONE HYDROCHLORIDE 50 MG: 50 TABLET, FILM COATED ORAL at 09:03

## 2024-03-17 RX ADMIN — POTASSIUM CHLORIDE 10 MEQ: 750 TABLET, EXTENDED RELEASE ORAL at 17:32

## 2024-03-17 RX ADMIN — Medication 1000 UNITS: at 09:03

## 2024-03-17 RX ADMIN — BUPROPION HYDROCHLORIDE 150 MG: 150 TABLET, EXTENDED RELEASE ORAL at 17:32

## 2024-03-17 RX ADMIN — PANTOPRAZOLE SODIUM 40 MG: 40 TABLET, DELAYED RELEASE ORAL at 05:15

## 2024-03-17 RX ADMIN — POTASSIUM CHLORIDE 10 MEQ: 750 TABLET, EXTENDED RELEASE ORAL at 09:03

## 2024-03-17 RX ADMIN — DOXAZOSIN 2 MG: 1 TABLET ORAL at 14:13

## 2024-03-17 RX ADMIN — AMLODIPINE BESYLATE 10 MG: 5 TABLET ORAL at 09:03

## 2024-03-17 RX ADMIN — ESCITALOPRAM OXALATE 5 MG: 10 TABLET, FILM COATED ORAL at 09:03

## 2024-03-17 RX ADMIN — BUPROPION HYDROCHLORIDE 150 MG: 150 TABLET, EXTENDED RELEASE ORAL at 09:03

## 2024-03-17 ASSESSMENT — COGNITIVE AND FUNCTIONAL STATUS - GENERAL
DRESSING REGULAR UPPER BODY CLOTHING: A LITTLE
STANDING UP FROM CHAIR USING ARMS: A LITTLE
TURNING FROM BACK TO SIDE WHILE IN FLAT BAD: A LITTLE
WALKING IN HOSPITAL ROOM: A LITTLE
MOBILITY SCORE: 18
CLIMB 3 TO 5 STEPS WITH RAILING: A LITTLE
CLIMB 3 TO 5 STEPS WITH RAILING: A LITTLE
TOILETING: A LITTLE
MOBILITY SCORE: 21
DAILY ACTIVITIY SCORE: 20
WALKING IN HOSPITAL ROOM: A LITTLE
HELP NEEDED FOR BATHING: A LITTLE
PERSONAL GROOMING: A LITTLE
MOVING TO AND FROM BED TO CHAIR: A LITTLE
MOVING TO AND FROM BED TO CHAIR: A LITTLE
MOVING FROM LYING ON BACK TO SITTING ON SIDE OF FLAT BED WITH BEDRAILS: A LITTLE

## 2024-03-17 ASSESSMENT — PAIN - FUNCTIONAL ASSESSMENT: PAIN_FUNCTIONAL_ASSESSMENT: 0-10

## 2024-03-17 ASSESSMENT — PAIN SCALES - GENERAL
PAINLEVEL_OUTOF10: 0 - NO PAIN
PAINLEVEL_OUTOF10: 0 - NO PAIN

## 2024-03-17 NOTE — PROGRESS NOTES
Physical Therapy    Physical Therapy Evaluation    Patient Name: Colt Lewis  MRN: [id number]  Today's Date: 3/17/2024   Time Calculation  Start Time: 1000  Stop Time: 1012  Time Calculation (min): 12 min    Assessment/Plan   PT Assessment  PT Assessment Results: Decreased strength, Decreased endurance, Impaired balance, Decreased mobility, Decreased coordination  Rehab Prognosis: Good  Evaluation/Treatment Tolerance: Patient limited by fatigue  End of Session Communication: Bedside nurse  Assessment Comment: pt with decreased mobility, gait strength balance and endurance pt to benefit from skilled PT to address deficits and improve functional mobility .  End of Session Patient Position: Up in chair, Alarm on  IP OR SWING BED PT PLAN  Inpatient or Swing Bed: Inpatient  PT Plan  Treatment/Interventions: Bed mobility, Transfer training, Gait training, Balance training, Strengthening, Endurance training, Therapeutic exercise, Therapeutic activity, Home exercise program  PT Plan: Skilled PT  PT Frequency: 3 times per week  PT Discharge Recommendations: Low intensity level of continued care  PT Recommended Transfer Status: Assist x1, Assistive device  PT - OK to Discharge: Yes (once medically ready for discharge to next level of care.)    Subjective     Current Problem:  Patient Active Problem List   Diagnosis    Acute cholecystitis    Elevated glucose    Essential hypertension, benign    Gastroesophageal reflux disease    Intellectual disability    Mixed hyperlipidemia    Morbid obesity (CMS/HCC)    Varicose veins of lower extremities with ulcer and inflammation (CMS/HCC)    Venous (peripheral) insufficiency    Venous stasis    Asymptomatic hypertensive urgency    Hypertensive urgency       General Visit Information:  General  Reason for Referral: weakness/ impaired mobility  Referred By: OT/PT 3/16 kishan .  Past Medical History Relevant to Rehab: HTn GERD, obesity , intellectual disability . HLD  Prior to Session  Communication: Bedside nurse (cleared for PT eval.)  Patient Position Received: Up in chair, Alarm on  General Comment: pt is a 54 yo male came to the hospital from home sent by home care with elevated BPs. dx: HTN urgency  230/115.   test/labs :  CXR neg  BP on 3/17 109/64.    Home Living:  Home Living  Home Living Comments: pt lives at home with his brother in a 1 story home with 2 steps with rail to enter.  pt reports he has a tub/shower with no equipment.    Prior Level of Function:  Prior Function Per Pt/Caregiver Report  Prior Function Comments: per pt he is mod I with gait and transfers with FWW . mod I adls and assistance with iadls. doesnt drive  no falls.    Precautions:  Precautions  Medical Precautions: Fall precautions    Objective     Pain:  Pain Assessment  Pain Assessment: 0-10  Pain Score: 0 - No pain    Cognition:  Cognition  Overall Cognitive Status: Within Functional Limits  Orientation Level: Disoriented to situation    General Assessments:     Strength  Strength Comments: BLE 4/5    Dynamic Sitting Balance  Dynamic Sitting-Comments: fair  Dynamic Standing Balance  Dynamic Standing-Comments: fair    Functional Assessments:     Bed Mobility  Bed Mobility: No  Transfers  Transfer: Yes  Transfer 1  Technique 1: Sit to stand, Stand to sit  Transfer Device 1: Walker  Transfer Level of Assistance 1: Contact guard  Ambulation/Gait Training  Ambulation/Gait Training Performed: Yes  Ambulation/Gait Training 1  Surface 1: Level tile  Device 1: Rolling walker  Assistance 1: Contact guard  Quality of Gait 1: Inconsistent stride length (slow gait speed.)  Comments/Distance (ft) 1: 30ft x2 with FWW cga cues to stay close to walker          Extremity/Trunk Assessments:        RLE   RLE : Within Functional Limits  LLE   LLE : Within Functional Limits    Outcome Measures:  [facility] Basic Mobility  Turning from your back to your side while in a flat bed without using bedrails: A little  Moving from lying on your  back to sitting on the side of a flat bed without using bedrails: A little  Moving to and from bed to chair (including a wheelchair): A little  Standing up from a chair using your arms (e.g. wheelchair or bedside chair): A little  To walk in hospital room: A little  Climbing 3-5 steps with railing: A little  Basic Mobility - Total Score: 18       Goals:  Encounter Problems       Encounter Problems (Active)       PT Problem       Pt will demonstrate mod I  with bed mobility to edge of bed.         Start:  03/17/24    Expected End:  03/31/24            Pt will demonstrate mod I  with sit to stand/chair transfers with FWW.         Start:  03/17/24    Expected End:  03/31/24            Pt will ambulate 75 feet with FWW mod I .         Start:  03/17/24    Expected End:  03/31/24            Pt to demo improved BLE strength by being able to complete supine/seated thera ex 2x20 BLEs with 4 or less rest breaks .         Start:  03/17/24    Expected End:  03/31/24               Pain - Adult            Education Documentation  Mobility Training, taught by Chandler Arora, PT at 3/17/2024 12:22 PM.  Learner: Patient  Readiness: Acceptance  Method: Explanation  Response: Verbalizes Understanding    Education Comments  No comments found.

## 2024-03-17 NOTE — DISCHARGE SUMMARY
Discharge Diagnosis  Asymptomatic hypertensive urgency    Issues Requiring Follow-Up      Discharge Meds     Your medication list        CONTINUE taking these medications        Instructions Last Dose Given Next Dose Due   amLODIPine 10 mg tablet  Commonly known as: Norvasc      Take 1 tablet (10 mg) by mouth once daily.       Blood Pressure Cuff misc  Generic drug: miscellaneous medical supply      1 Device once daily.       buPROPion  mg 12 hr tablet  Commonly known as: Wellbutrin SR           calcium-magnesium-copper-zinc tablet           cholecalciferol 25 MCG (1000 UT) tablet  Commonly known as: Vitamin D-3           cloNIDine 0.3 mg/24 hr patch  Commonly known as: Catapres-TTS      Place 1 patch on the skin 1 (one) time per week.       doxazosin 2 mg tablet  Commonly known as: Cardura      Take 1 tablet (2 mg) by mouth 2 times a day.       escitalopram 5 mg tablet  Commonly known as: Lexapro           metoprolol succinate  mg 24 hr tablet  Commonly known as: Toprol-XL      Take 1 tablet (100 mg) by mouth once daily.       naltrexone 50 mg tablet  Commonly known as: Depade           omeprazole 20 mg DR capsule  Commonly known as: PriLOSEC           potassium chloride CR 10 mEq ER tablet  Commonly known as: Klor-Con           valsartan-hydrochlorothiazide 320-25 mg tablet  Commonly known as: Diovan-HCT      Take 1 tablet by mouth once daily.                Test Results Pending At Discharge  Pending Labs       No current pending labs.            Hospital Course   53-year-old male with past medical history of developmental delay, morbid obesity, resistant hypertension who presented to the ER due to recommendation of home health aide due to elevated blood pressure/hypertensive urgency.  Initially he was treated in the ICU on nicardipine drip.  Was seen by cardiology as per cardiology Cardura has recently been increased and now he is supposed to take 2 mg twice daily.  His blood pressure has been stable  for the past 24 hours.  Asymptomatic, no chest pains, shortness of breath, dizziness or focal logical deficits.  Wants to go home.  We will resume home health care at discharge.  He is medically ready to be discharged.    Pertinent Physical Exam At Time of Discharge  Physical Exam  Eyes:      Pupils: Pupils are equal, round, and reactive to light.   Cardiovascular:      Rate and Rhythm: Normal rate and regular rhythm.   Pulmonary:      Effort: Pulmonary effort is normal.   Abdominal:      Palpations: Abdomen is soft.   Musculoskeletal:         General: Normal range of motion.      Cervical back: Normal range of motion.      Right lower le+ Edema present.      Left lower le+ Edema present.   Skin:     General: Skin is warm and dry.      Capillary Refill: Capillary refill takes less than 2 seconds.   Neurological:      Mental Status: He is alert.   Outpatient Follow-Up  Future Appointments   Date Time Provider Department Center   2024  1:30 PM Sudeep Sanchez MD [id number] None         Mauri Jones MD

## 2024-03-17 NOTE — PROGRESS NOTES
03/17/24 1427   Discharge Planning   Living Arrangements Family members   Support Systems Family members   Type of Residence Private residence   Type of Home Care Services Home PT;Home OT;Home nursing visits   Patient expects to be discharged to: home   Does the patient need discharge transport arranged? Yes   RoundTrip coordination needed? Yes   Patient Choice   Patient / Family choosing to utilize agency / facility established prior to hospitalization Yes  ([facility])     Patient being discharged to home today with previous services with the [facility] program with [facility]. Referral sent to [facility] for skilled therapy. PT evaluated and recommended low intensity therapy. writerreviewed with patient who is agreeable for continued therapy and would prefer [facility] if provide the service. Referral sent in careport/ along with [facility] orders. ROCHELLE Barriga  Update 03/18/2024 spoke with [facility] intake who reports nurse is with patient now. Also provides a HHA. They do not offer PT but do range of motion with patient and encourage him to get up and move. She reports not very motivated to move. [facility] will continue to manage his LOC needs. ROCHELLE Barriga

## 2024-03-17 NOTE — PROGRESS NOTES
Jacinta thanks  [facility] Progress Note           Rounding Cardiologist:  Dr. Sudeep Sanchez  Primary Cardiologist: Dr. Sudeep Sanchez    Date:  3/17/2024  Patient:  Colt Lewis  YOB: 1970  MRN:  [id number]   Admit Date:  3/15/2024      SUBJECTIVE:    Colt Lewis was seen and examined.    He is sitting up in the chair.     He denies any chest pain or shortness of breath.     Blood pressures have been well controlled.      Definite concerns about compliance as he is somewhat resistant to taking his meds here in the hospital.        Consult HPI:  Colt Lewis is a 54-year-old male patient being seen at the request of VIVIAN Sky for recommendations regarding resistant hypertension.  She had recent initial evaluation in the office February 9, 2024.  He does not have any history of atherosclerotic heart or valvular heart disease.  He has a history of hypertension dating back more than 20 years.  He has been treated primarily at [facility].  Most recently he has been maintained on amlodipine, Catapres TTS patch, Toprol-XL, and valsartan HCT.  He has a history of hyperlipidemia.  No history of diabetes mellitus.  He does not smoke.  He has a history of morbid obesity ,chronic venous insufficiency, and chronic edema.  He has a home health nurse who comes out 3 days a week to wrap his legs.  He notes that several family members developed hypertension at young ages.  He does not specifically recall having had any evaluation for secondary hypertension.  He has been hospitalized on a few occasions with hypertensive urgency.  He apparently has not very compliant with his medications or with diet.  He admittedly is very sedentary.  He ambulates with use of a walker.  An echocardiogram May 1, 2023 showed an estimated LV ejection fraction 60 to 65%.  Trivial tricuspid regurgitation.  He has a history of intellectual disability.  T     He was placed on doxazosin 1 mg daily and this  was recently increased to 2 mg twice daily.  A recent renal arterial ultrasound was negative for renal artery stenosis.  Fractionated metanephrines, plasma renin aldosterone, and plasma aldosterone concentrations were normal.      He was brought to emergency department yesterday due to consistent systolic blood pressure readings in the low 200s.  His blood pressure in the ED remained 200s despite receiving IV labetalol 20 mg x 2, IV hydralazine 10 mg IV x 2, and Catapres 0.1 mg x 2.  His vital signs were otherwise stable.  CMP and CBC normal.  TSH 1.20.  Magnesium 2.5.  Chest x-ray did not show any active disease.  EKG shows normal sinus rhythm with incomplete right bundle branch block and minor nonspecific ST-T wave changes.     Patient was ordered to be placed on intravenous nicardipine drip and admitted to the ICU.  When patient arrived his blood pressure had improved to the 170 systolic.  Nicardipine drip was not started.  Patient was placed on increased doses of amlodipine and doxazosin.  He currently is resting comfortably at the side of the bed.  He denies any chest pain or shortness of breath.  He denies any orthopnea, PND, or increasing peripheral edema.  He denies any palpitations, lightheadedness, near-syncope, or syncope.  He denies any fever, chills, or cough.  He denies any nausea, vomiting, or diaphoresis.  He denies any hemoptysis, hematemesis, melena, or hematochezia.      VITALS:     Vitals:    03/17/24 0427 03/17/24 0600 03/17/24 0737 03/17/24 1234   BP: 109/64  137/71 103/59   BP Location: Left arm  Left arm    Patient Position: Lying  Lying Sitting   Pulse: 57  72 62   Resp: 20  20 20   Temp: 36.6 °C (97.9 °F)  36.2 °C (97.2 °F) 36.1 °C (97 °F)   TempSrc: Temporal  Temporal Temporal   SpO2: 94%  94% 94%   Weight:  135 kg (297 lb 9.9 oz)     Height:           Intake/Output Summary (Last 24 hours) at 3/17/2024 1309  Last data filed at 3/17/2024 0957  Gross per 24 hour   Intake 1920 ml   Output  2650 ml   Net -730 ml       Wt Readings from Last 4 Encounters:   03/17/24 135 kg (297 lb 9.9 oz)   12/13/23 138 kg (304 lb 6.4 oz)   07/31/20 133 kg (294 lb)       CURRENT HOSPITAL MEDICATIONS:   amLODIPine, 10 mg, oral, Daily  buPROPion SR, 150 mg, oral, BID  cholecalciferol, 1,000 Units, oral, Daily  [START ON 3/21/2024] cloNIDine, 1 patch, transdermal, Weekly  doxazosin, 2 mg, oral, BID  enoxaparin, 40 mg, subcutaneous, q12h NANNETTE  escitalopram, 5 mg, oral, Daily  metoprolol succinate XL, 100 mg, oral, Daily  naltrexone, 50 mg, oral, Daily  pantoprazole, 40 mg, oral, Daily  potassium chloride CR, 10 mEq, oral, BID  valsartan 320 mg, hydroCHLOROthiazide 25 mg for Diovan HCT, , oral, Daily         Current Outpatient Medications   Medication Instructions    amLODIPine (NORVASC) 10 mg, oral, Daily    buPROPion SR (WELLBUTRIN SR) 150 mg, oral, 2 times daily    calcium carb/mag oxide/Cu/zinc (calcium-magnesium-copper-zinc) tablet 1 tablet, oral, Daily RT    cholecalciferol (Vitamin D-3) 25 MCG (1000 UT) tablet 1 tablet, oral, Daily    cloNIDine (Catapres-TTS) 0.3 mg/24 hr patch 1 patch, transdermal, Weekly    doxazosin (CARDURA) 2 mg, oral, 2 times daily (0900,1400)    escitalopram (LEXAPRO) 5 mg, oral, Daily    metoprolol succinate XL (TOPROL-XL) 100 mg, oral, Daily    miscellaneous medical supply (Blood Pressure Cuff) misc 1 Device, miscellaneous, Daily    naltrexone (DEPADE) 50 mg, oral, Daily    omeprazole (PriLOSEC) 20 mg DR capsule 1 capsule, oral, Daily    potassium chloride CR 10 mEq ER tablet 10 mEq, oral, 2 times daily    valsartan-hydrochlorothiazide (Diovan-HCT) 320-25 mg tablet 1 tablet, oral, Daily        PHYSICAL EXAMINATION:   GENERAL:  Well developed, well nourished, in no acute distress.  CHEST:  Symmetric and nontender.  NEURO/PSYCH:  Alert and oriented times three with approppriate behavior and responses.  NECK:  Supple, no JVD, no bruit.  LUNGS:  Clear to auscultation bilaterally, normal respiratory  effort.  HEART:  Rate and rhythm regular with no evident murmur, no gallop appreciated.        There are no rubs, clicks or heaves.  EXTREMITIES:  Warm with good color, no clubbing or cyanosis.  There is no edema noted.  PERIPHERAL VASCULAR:  Pulses present and equally palpable; 2+ throughout.      LAB DATA:     CBC:   Results from last 7 days   Lab Units 03/17/24  0613   WBC AUTO x10*3/uL 6.9   RBC AUTO x10*6/uL 4.50   HEMOGLOBIN g/dL 12.6*   HEMATOCRIT % 37.9*   PLATELETS AUTO x10*3/uL 232        CMP:    Results from last 7 days   Lab Units 03/17/24  0613   SODIUM mmol/L 135*   POTASSIUM mmol/L 3.8   CHLORIDE mmol/L 101   CO2 mmol/L 28   BUN mg/dL 23   CREATININE mg/dL 0.93   GLUCOSE mg/dL 115*   CALCIUM mg/dL 8.9       Cardiac Enzymes:    Lab Results   Component Value Date    TROPHS 10 03/31/2023    TROPHS 10 03/31/2023    TROPHS 8 03/31/2023       Magnesium:    Lab Results   Component Value Date    MG 2.08 03/17/2024       TSH:    Lab Results   Component Value Date    TSH 1.20 03/15/2024       BNP:   Lab Results   Component Value Date    BNP 66 01/12/2023        CBC:  Lab Results   Component Value Date    WBC 6.9 03/17/2024    WBC 9.5 03/16/2024    WBC 7.2 03/15/2024    RBC 4.50 03/17/2024    RBC 4.84 03/16/2024    RBC 5.14 03/15/2024    HGB 12.6 (L) 03/17/2024    HGB 13.4 (L) 03/16/2024    HGB 14.3 03/15/2024    HCT 37.9 (L) 03/17/2024    HCT 40.7 (L) 03/16/2024    HCT 43.0 03/15/2024    MCV 84 03/17/2024    MCV 84 03/16/2024    MCV 84 03/15/2024    MCH 28.0 03/17/2024    MCH 27.7 03/16/2024    MCH 27.8 03/15/2024    MCHC 33.2 03/17/2024    MCHC 32.9 03/16/2024    MCHC 33.3 03/15/2024    RDW 14.2 03/17/2024    RDW 14.0 03/16/2024    RDW 13.7 03/15/2024     03/17/2024     03/16/2024     03/15/2024       BMP:  Lab Results   Component Value Date     (L) 03/17/2024     03/16/2024     03/15/2024    K 3.8 03/17/2024    K 3.8 03/16/2024    K 3.7 03/15/2024     03/17/2024      03/16/2024     03/15/2024    CO2 28 03/17/2024    CO2 27 03/16/2024    CO2 31 03/15/2024    BUN 23 03/17/2024    BUN 11 03/16/2024    BUN 12 03/15/2024    CREATININE 0.93 03/17/2024    CREATININE 0.60 03/16/2024    CREATININE 0.75 03/15/2024       Hepatic Function Panel:    Lab Results   Component Value Date    ALKPHOS 77 03/15/2024    ALT 12 03/15/2024    AST 13 03/15/2024    PROT 7.8 03/15/2024    BILITOT 0.3 03/15/2024       DIAGNOSTIC TESTING:     ECG 12 lead    Result Date: 3/16/2024  Normal sinus rhythm Nonspecific T wave abnormality Abnormal ECG When compared with ECG of 31-MAR-2023 16:10, Previous ECG has undetermined rhythm, needs review Non-specific change in ST segment in Anterior leads See ED provider note for full interpretation and clinical correlation Confirmed by Zoie Prabhakar ([id number]) on 3/16/2024 12:09:36 PM    XR chest 1 view    Result Date: 3/15/2024  Interpreted By:  Peng Davis, STUDY: XR CHEST 1 VIEW;  3/15/2024 3:49 pm   INDICATION: Signs/Symptoms:Elevated blood pressure.   COMPARISON: 03/31/2023.   ACCESSION NUMBER(S): [id number]   ORDERING CLINICIAN: MARII PALAFOX   FINDINGS:   The cardiac silhouette is unremarkable. Costophrenic angles are sharp. Lungs are clear. The trachea is midline. There is no pneumothorax. No acute osseous abnormality is seen.       1.  No active cardiopulmonary process.     Signed by: Peng Dvais 3/15/2024 3:58 PM Dictation workstation:   [id number]       RADIOLOGY:     XR chest 1 view   Final Result   1.  No active cardiopulmonary process.             Signed by: Peng Davis 3/15/2024 3:58 PM   Dictation workstation:   [id number]          PROBLEM LIST     Patient Active Problem List   Diagnosis    Acute cholecystitis    Elevated glucose    Essential hypertension, benign    Gastroesophageal reflux disease    Intellectual disability    Mixed hyperlipidemia    Morbid obesity (CMS/HCC)    Varicose veins of lower extremities with ulcer and  inflammation (CMS/HCC)    Venous (peripheral) insufficiency    Venous stasis    Asymptomatic hypertensive urgency    Hypertensive urgency       ASSESSMENT:     1.  Resistant hypertension with hypertensive urgency.  2.  Chronic venous insufficiency.  3.  No actual disability.  4.  Mixed hyperlipidemia.  5.  Morbid obesity.         PLAN:     Continue current meds including the increased dose of doxazosin 2 mg twice daily.    Okay Procardia standpoint to discharge home and follow-up as scheduled.      Please do not hesitate to call with questions.  Electronically signed by Sudeep Sanchez MD, on 3/17/2024 at 1:09 PM

## 2024-03-17 NOTE — CARE PLAN
The patient's goals for the shift include      The clinical goals for the shift include maintain stable BP      Problem: Pain - Adult  Goal: Verbalizes/displays adequate comfort level or baseline comfort level  Outcome: Progressing     Problem: Safety - Adult  Goal: Free from fall injury  Outcome: Progressing     Problem: Discharge Planning  Goal: Discharge to home or other facility with appropriate resources  Outcome: Progressing     Problem: Chronic Conditions and Co-morbidities  Goal: Patient's chronic conditions and co-morbidity symptoms are monitored and maintained or improved  Outcome: Progressing     Problem: Skin  Goal: Participates in plan/prevention/treatment measures  Outcome: Progressing  Goal: Prevent/manage excess moisture  Outcome: Progressing  Goal: Prevent/minimize sheer/friction injuries  Outcome: Progressing  Goal: Promote/optimize nutrition  Outcome: Progressing     Problem: Fall/Injury  Goal: Not fall by end of shift  Outcome: Progressing  Goal: Be free from injury by end of the shift  Outcome: Progressing  Goal: Verbalize understanding of personal risk factors for fall in the hospital  Outcome: Progressing  Goal: Verbalize understanding of risk factor reduction measures to prevent injury from fall in the home  Outcome: Progressing  Goal: Use assistive devices by end of the shift  Outcome: Progressing  Goal: Pace activities to prevent fatigue by end of the shift  Outcome: Progressing

## 2024-03-17 NOTE — NURSING NOTE
Spoke with Amisha MEDRANO, regarding discharge orders. POA is concerned that patient may not take nighttime medications correct this evening as his [facility] services will not be in to set up his medications until tomorrow. Communicated with physician and will administer night time bupropion and potassium at dinnertime. POA aware and grateful for solution. Spoke with patient's brother to advise him that patient will be returning home and that transportation is expected at approximately 1800.

## 2024-03-17 NOTE — CARE PLAN
Problem: Pain - Adult  Goal: Verbalizes/displays adequate comfort level or baseline comfort level  3/17/2024 1646 by Shaista Kaye RN  Outcome: Adequate for Discharge  3/17/2024 0800 by Shaista Kaye RN  Flowsheets (Taken 3/16/2024 1622)  Verbalizes/displays adequate comfort level or baseline comfort level:   Encourage patient to monitor pain and request assistance   Assess pain using appropriate pain scale   Administer analgesics based on type and severity of pain and evaluate response   Implement non-pharmacological measures as appropriate and evaluate response   Consider cultural and social influences on pain and pain management   Notify Licensed Independent Practitioner if interventions unsuccessful or patient reports new pain     Problem: Safety - Adult  Goal: Free from fall injury  3/17/2024 1646 by Shaista Kaye RN  Outcome: Adequate for Discharge  3/17/2024 0800 by Shaista Kaye RN  Flowsheets (Taken 3/16/2024 1622)  Free from fall injury: Instruct family/caregiver on patient safety     Problem: Discharge Planning  Goal: Discharge to home or other facility with appropriate resources  3/17/2024 1646 by Shaista Kaye RN  Outcome: Adequate for Discharge  3/17/2024 0800 by Shaista Kaye RN  Flowsheets (Taken 3/16/2024 1622)  Discharge to home or other facility with appropriate resources:   Identify barriers to discharge with patient and caregiver   Arrange for needed discharge resources and transportation as appropriate   Identify discharge learning needs (meds, wound care, etc)   Refer to discharge planning if patient needs post-hospital services based on physician order or complex needs related to functional status, cognitive ability or social support system     Problem: Chronic Conditions and Co-morbidities  Goal: Patient's chronic conditions and co-morbidity symptoms are monitored and maintained or improved  3/17/2024 1646 by Shaista Kaye RN  Outcome: Adequate for Discharge  3/17/2024  0800 by Shaista Kaye RN  Flowsheets (Taken 3/16/2024 1622)  Care Plan - Patient's Chronic Conditions and Co-Morbidity Symptoms are Monitored and Maintained or Improved:   Monitor and assess patient's chronic conditions and comorbid symptoms for stability, deterioration, or improvement   Collaborate with multidisciplinary team to address chronic and comorbid conditions and prevent exacerbation or deterioration   Update acute care plan with appropriate goals if chronic or comorbid symptoms are exacerbated and prevent overall improvement and discharge     Problem: Skin  Goal: Participates in plan/prevention/treatment measures  3/17/2024 1646 by Shaista Kaye RN  Outcome: Adequate for Discharge  3/17/2024 0800 by Shaista Kaye RN  Flowsheets (Taken 3/17/2024 0800)  Participates in plan/prevention/treatment measures:   Discuss with provider PT/OT consult   Elevate heels  Goal: Prevent/manage excess moisture  3/17/2024 1646 by Shaista Kaye RN  Outcome: Adequate for Discharge  3/17/2024 0800 by Shaista Kaye RN  Flowsheets (Taken 3/17/2024 0800)  Prevent/manage excess moisture:   Cleanse incontinence/protect with barrier cream   Moisturize dry skin  Goal: Prevent/minimize sheer/friction injuries  3/17/2024 1646 by Shaista Kaye RN  Outcome: Adequate for Discharge  3/17/2024 0800 by Shaista Kaye RN  Flowsheets (Taken 3/17/2024 0800)  Prevent/minimize sheer/friction injuries:   Complete micro-shifts as needed if patient unable. Adjust patient position to relieve pressure points, not a full turn   Use pull sheet   HOB 30 degrees or less   Turn/reposition every 2 hours/use positioning/transfer devices  Goal: Promote/optimize nutrition  3/17/2024 1646 by Shaista Kaye RN  Outcome: Adequate for Discharge  3/17/2024 0800 by Shaista Kaye RN  Flowsheets (Taken 3/17/2024 0800)  Promote/optimize nutrition: Offer water/supplements/favorite foods     Problem: Fall/Injury  Goal: Not fall by end of  shift  Outcome: Adequate for Discharge  Goal: Be free from injury by end of the shift  Outcome: Adequate for Discharge  Goal: Verbalize understanding of personal risk factors for fall in the hospital  Outcome: Adequate for Discharge  Goal: Verbalize understanding of risk factor reduction measures to prevent injury from fall in the home  Outcome: Adequate for Discharge  Goal: Use assistive devices by end of the shift  Outcome: Adequate for Discharge  Goal: Pace activities to prevent fatigue by end of the shift  Outcome: Adequate for Discharge   The patient's goals for the shift include      The clinical goals for the shift include Patient's SBP will remain between 100 and 150 throughout this shift.    Over the shift, the patient did make progress adequate for discharge toward care plan goals.

## 2024-03-19 ENCOUNTER — PATIENT OUTREACH (OUTPATIENT)
Dept: CARDIOLOGY | Facility: CLINIC | Age: 54
End: 2024-03-19
Payer: MEDICAID

## 2024-03-19 NOTE — PROGRESS NOTES
Discharge Facility:  [facility]  Discharge Diagnosis:  Asymptomatic Hypertensive urgency  Admission Date: 3/15/24  Discharge Date: 3/17/24    PCP Appointment Date: none listed  Specialist Appointment Date:  6/17/24 Dr Sanchez tasking for sooner [facility]  Hospital Encounter and Summary: Linked     2 calls with no contact with patient

## 2024-03-20 ENCOUNTER — HOME HEALTH ADMISSION (OUTPATIENT)
Dept: HOME HEALTH SERVICES | Facility: HOME HEALTH | Age: 54
End: 2024-03-20
Payer: MEDICAID

## 2024-03-20 ENCOUNTER — DOCUMENTATION (OUTPATIENT)
Dept: HOME HEALTH SERVICES | Facility: HOME HEALTH | Age: 54
End: 2024-03-20
Payer: MEDICAID

## 2024-03-20 NOTE — HH CARE COORDINATION
Home Care received a Referral for Physical Therapy and Occupational Therapy. We have processed the referral for a Start of Care on 03/21/2024  .     If you have any questions or concerns, please feel free to contact us at [phone]. Follow the prompts, enter your five digit zip code, and you will be directed to your care team on [facility] 1.

## 2024-03-21 ENCOUNTER — HOME CARE VISIT (OUTPATIENT)
Dept: HOME HEALTH SERVICES | Facility: HOME HEALTH | Age: 54
End: 2024-03-21

## 2024-03-22 ENCOUNTER — HOME CARE VISIT (OUTPATIENT)
Dept: HOME HEALTH SERVICES | Facility: HOME HEALTH | Age: 54
End: 2024-03-22

## 2024-04-02 ENCOUNTER — PATIENT OUTREACH (OUTPATIENT)
Dept: CARDIOLOGY | Facility: CLINIC | Age: 54
End: 2024-04-02

## 2024-04-02 NOTE — PROGRESS NOTES
Unable to reach patient for call back after patient's follow up appointment with PCP.   JUDDM with call back number for patient to call if needed   If no voicemail available call attempts x 2 were made to contact the patient to assist with any questions or concerns patient may have.

## 2024-04-17 ENCOUNTER — OFFICE VISIT (OUTPATIENT)
Dept: PRIMARY CARE | Facility: CLINIC | Age: 54
End: 2024-04-17
Payer: MEDICAID

## 2024-04-17 VITALS
SYSTOLIC BLOOD PRESSURE: 120 MMHG | DIASTOLIC BLOOD PRESSURE: 84 MMHG | BODY MASS INDEX: 48.18 KG/M2 | HEIGHT: 67 IN | WEIGHT: 307 LBS | HEART RATE: 72 BPM | TEMPERATURE: 98.2 F

## 2024-04-17 DIAGNOSIS — E66.01 MORBID OBESITY (MULTI): ICD-10-CM

## 2024-04-17 DIAGNOSIS — I16.0 ASYMPTOMATIC HYPERTENSIVE URGENCY: ICD-10-CM

## 2024-04-17 DIAGNOSIS — I1A.0 RESISTANT HYPERTENSION: ICD-10-CM

## 2024-04-17 DIAGNOSIS — E78.2 MIXED HYPERLIPIDEMIA: ICD-10-CM

## 2024-04-17 DIAGNOSIS — I87.8 VENOUS STASIS: ICD-10-CM

## 2024-04-17 DIAGNOSIS — Z09 HOSPITAL DISCHARGE FOLLOW-UP: Primary | ICD-10-CM

## 2024-04-17 DIAGNOSIS — I10 ESSENTIAL HYPERTENSION, BENIGN: ICD-10-CM

## 2024-04-17 DIAGNOSIS — F79 INTELLECTUAL DISABILITY: ICD-10-CM

## 2024-04-17 PROCEDURE — 1036F TOBACCO NON-USER: CPT | Performed by: FAMILY MEDICINE

## 2024-04-17 PROCEDURE — 3074F SYST BP LT 130 MM HG: CPT | Performed by: FAMILY MEDICINE

## 2024-04-17 PROCEDURE — 99214 OFFICE O/P EST MOD 30 MIN: CPT | Performed by: FAMILY MEDICINE

## 2024-04-17 PROCEDURE — 3079F DIAST BP 80-89 MM HG: CPT | Performed by: FAMILY MEDICINE

## 2024-04-17 NOTE — PROGRESS NOTES
"Subjective   Chief complaint: Colt Lewis is a 53 y.o. male who presents for Follow-up (Patient is here in office today to transition care, he is former Dr. Miner patient.  Patient was recently discharged from [facility] 3/15-3/16 for hypertensive urgency. ).    HPI:  \[facility] [place] [facility]\""  Hospital follow-up.  History of intellectual disability.  Accompanied today by his guardian.  That is a second-degree family relative.  However she is going to probably change to guardianship since she is getting older and she might be moving.  He will need a psychiatric evaluation for establishing guardianship.  At 1 point when he was in the hospital in the remote past he was seen by psychiatry recommended outpatient eval but when he went home he did not complete that.  His recent hospitalization was result of hypertensive urgency.  Medical records were reviewed.  He was admitted seen for hypertensive urgency.  He was admitted.  ICU.  Was seen by cardiology.  Subsequently his medications were adjusted.  Doxazosin was increased to 2 mg twice daily.  He was subsequently discharged home.  Presents today for follow-up.  No current complaints.  Blood pressure is controlled.  Medications are reconciled.  For now we will continue his current medical therapy.  Encouraged medication compliance.  Psychiatry consultation to evaluate intellectual disability.  General medical follow-up in 3 months with labs prior to follow-up.  Refills as needed in the interim.  Objective   /84 (BP Location: Left arm, Patient Position: Sitting, BP Cuff Size: Adult)   Pulse 72   Temp 36.8 °C (98.2 °F) (Temporal)   Ht 1.702 m (5' 7\")   Wt 139 kg (307 lb)   BMI 48.08 kg/m²   Physical Exam  Review of Systems   I have reviewed and reconciled the medication list with the patient today.   Current Outpatient Medications:     amLODIPine (Norvasc) 10 mg tablet, Take 1 tablet (10 mg) by mouth once daily., Disp: 90 tablet, Rfl: 1    buPROPion SR (Wellbutrin SR) 150 mg 12 hr " tablet, Take 1 tablet (150 mg) by mouth 2 times a day., Disp: , Rfl:     calcium carb/mag oxide/Cu/zinc (calcium-magnesium-copper-zinc) tablet, Take 1 tablet by mouth once daily., Disp: , Rfl:     cholecalciferol (Vitamin D-3) 25 MCG (1000 UT) tablet, Take 1 tablet (25 mcg) by mouth once daily., Disp: , Rfl:     cloNIDine (Catapres-TTS) 0.3 mg/24 hr patch, Place 1 patch on the skin 1 (one) time per week., Disp: 8 patch, Rfl: 1    doxazosin (Cardura) 2 mg tablet, Take 1 tablet (2 mg) by mouth 2 times a day., Disp: 180 tablet, Rfl: 3    escitalopram (Lexapro) 5 mg tablet, Take 1 tablet (5 mg) by mouth once daily., Disp: , Rfl:     metoprolol succinate XL (Toprol-XL) 100 mg 24 hr tablet, Take 1 tablet (100 mg) by mouth once daily., Disp: 90 tablet, Rfl: 1    miscellaneous medical supply (Blood Pressure Cuff) misc, 1 Device once daily., Disp: 1 each, Rfl: 0    naltrexone (Depade) 50 mg tablet, Take 1 tablet (50 mg) by mouth once daily., Disp: , Rfl:     omeprazole (PriLOSEC) 20 mg DR capsule, Take 1 capsule (20 mg) by mouth once daily., Disp: , Rfl:     potassium chloride CR 10 mEq ER tablet, Take 1 tablet (10 mEq) by mouth 2 times a day., Disp: , Rfl:     valsartan-hydrochlorothiazide (Diovan-HCT) 320-25 mg tablet, Take 1 tablet by mouth once daily., Disp: 90 tablet, Rfl: 1     Imaging:  No results found.     Labs reviewed:    Lab Results   Component Value Date    WBC 6.9 03/17/2024    HGB 12.6 (L) 03/17/2024    HCT 37.9 (L) 03/17/2024     03/17/2024    CHOL 185 03/04/2024    TRIG 108 03/04/2024    HDL 45.8 03/04/2024    ALT 12 03/15/2024    AST 13 03/15/2024     (L) 03/17/2024    K 3.8 03/17/2024     03/17/2024    CREATININE 0.93 03/17/2024    BUN 23 03/17/2024    CO2 28 03/17/2024    TSH 1.20 03/15/2024    PSA 0.15 03/04/2024    INR 1.3 (H) 03/31/2023       Assessment/Plan   Problem List Items Addressed This Visit             ICD-10-CM    Essential hypertension, benign I10    Intellectual  disability F79    Relevant Orders    Referral to Psychiatry    Mixed hyperlipidemia E78.2    Relevant Orders    Comprehensive metabolic panel    Lipid panel    Morbid obesity (Multi) E66.01    Venous stasis I87.8    Asymptomatic hypertensive urgency I16.0     Other Visit Diagnoses         Codes    Hospital discharge follow-up    -  Primary Z09    Resistant hypertension     I1A.0            Reinforced lifestyle modifications  Continue current medications as listed  Physical activity as tolerated and healthy diet encouraged  Maintain a healthy weight  Follow up in  3mo

## 2024-04-29 DIAGNOSIS — I10 ESSENTIAL HYPERTENSION, BENIGN: ICD-10-CM

## 2024-04-29 RX ORDER — CLONIDINE 0.3 MG/24H
1 PATCH, EXTENDED RELEASE TRANSDERMAL
Qty: 8 PATCH | Refills: 1 | Status: SHIPPED | OUTPATIENT
Start: 2024-04-29

## 2024-05-01 DIAGNOSIS — F32.A DEPRESSION, UNSPECIFIED: ICD-10-CM

## 2024-05-02 RX ORDER — BUPROPION HYDROCHLORIDE 150 MG/1
150 TABLET, EXTENDED RELEASE ORAL 2 TIMES DAILY
Qty: 180 TABLET | Refills: 1 | Status: SHIPPED | OUTPATIENT
Start: 2024-05-02

## 2024-06-17 ENCOUNTER — APPOINTMENT (OUTPATIENT)
Dept: CARDIOLOGY | Facility: CLINIC | Age: 54
End: 2024-06-17
Payer: MEDICAID

## 2024-06-17 VITALS
WEIGHT: 315 LBS | HEIGHT: 67 IN | SYSTOLIC BLOOD PRESSURE: 122 MMHG | BODY MASS INDEX: 49.44 KG/M2 | HEART RATE: 73 BPM | DIASTOLIC BLOOD PRESSURE: 76 MMHG

## 2024-06-17 DIAGNOSIS — I87.2 VENOUS (PERIPHERAL) INSUFFICIENCY: ICD-10-CM

## 2024-06-17 DIAGNOSIS — I1A.0 RESISTANT HYPERTENSION: ICD-10-CM

## 2024-06-17 DIAGNOSIS — I16.0 ASYMPTOMATIC HYPERTENSIVE URGENCY: Primary | ICD-10-CM

## 2024-06-17 DIAGNOSIS — E78.2 MIXED HYPERLIPIDEMIA: ICD-10-CM

## 2024-06-17 PROCEDURE — 3078F DIAST BP <80 MM HG: CPT | Performed by: INTERNAL MEDICINE

## 2024-06-17 PROCEDURE — 99214 OFFICE O/P EST MOD 30 MIN: CPT | Performed by: INTERNAL MEDICINE

## 2024-06-17 PROCEDURE — 3074F SYST BP LT 130 MM HG: CPT | Performed by: INTERNAL MEDICINE

## 2024-06-17 NOTE — PROGRESS NOTES
Patient:  Colt Lewis  YOB: 1970  MRN: [id number]       HPI:       Colt Lewis is a 53 y.o. male who returns today for cardiac follow-up.  He was seen overnight, 2024 for resistant hypertension. He has been noted to be intellectually challenged.  He does not have any history of atherosclerotic heart or valvular heart disease.  He has a history of hypertension dating back more than 20 years.  He has been treated primarily at [facility].  Most recently he has been maintained on amlodipine, Catapres TTS patch, Toprol-XL, and valsartan HCT.  He has a history of hyperlipidemia.  No history of diabetes mellitus.  He does not smoke.  He has a history of morbid obesity ,chronic venous insufficiency, and chronic edema.  He has a home health nurse who comes out 3 days a week to wrap his legs.  He notes that several family members developed hypertension at young ages.  He does not specifically recall having had any evaluation for secondary hypertension.  He has been hospitalized on a few occasions with hypertensive urgency.  He apparently has not very compliant with his medications or with diet.  He admittedly is very sedentary.  He ambulates with use of a walker.  At the time of his visit he was started on doxazosin 1 mg daily.  A renal arterial ultrasound did not show any significant stenosis.    He was hospitalized at [facility] March 15, 2024 through March 17, 2024 for asymptomatic hypertensive urgency.  A was initially treated in the ICU and placed on a nicardipine drip.  Doxazosin was increased to 2 mg twice daily.       He has been doing very well since his discharge.  His blood pressures are much improved.  He denies any chest pain or shortness of breath.  He denies any orthopnea or PND.  He denies any palpitations, lightheadedness, near-syncope, or syncope.  He denies any fever, chills, or cough.  He denies any nausea, vomiting, or diaphoresis.  He denies any hemoptysis, hematemesis,  melena, or hematochezia.  EKG showed normal sinus rhythm with nonspecific ST-T wave changes.  Lab studies March 4, 2024 were negative for elevated catecholamines or metanephrines.  Aldosterone and renin activity levels were normal.  CBC and CMP were normal.  TSH was 1.20.  An echocardiogram May 1, 2023 showed an estimated LV ejection fraction 60 to 65%.  Trivial tricuspid regurgitation. He is blood pressures are well controlled.  He is free of any anginal or CHF symptoms.  He will be continued on his same medications. Other details as noted below.        Objective:     Vitals:    06/17/24 1340   BP: 122/76   Pulse: 73       Wt Readings from Last 4 Encounters:   06/17/24 149 kg (329 lb)   04/17/24 139 kg (307 lb)   03/17/24 135 kg (297 lb 9.9 oz)   12/13/23 138 kg (304 lb 6.4 oz)       Allergies:     Allergies   Allergen Reactions    Penicillins Hives    Sulfamethoxazole-Trimethoprim Rash          Medications:     Current Outpatient Medications   Medication Instructions    amLODIPine (NORVASC) 10 mg, oral, Daily    buPROPion SR (WELLBUTRIN SR) 150 mg, oral, 2 times daily    calcium carb/mag oxide/Cu/zinc (calcium-magnesium-copper-zinc) tablet 1 tablet, oral, Daily RT    cholecalciferol (Vitamin D-3) 25 MCG (1000 UT) tablet 1 tablet, oral, Daily    cloNIDine (Catapres-TTS) 0.3 mg/24 hr patch 1 patch, transdermal, Once Weekly    doxazosin (CARDURA) 2 mg, oral, 2 times daily (0900,1400)    escitalopram (LEXAPRO) 5 mg, oral, Daily    metoprolol succinate XL (TOPROL-XL) 100 mg, oral, Daily    miscellaneous medical supply (Blood Pressure Cuff) misc 1 Device, miscellaneous, Daily    naltrexone (DEPADE) 50 mg, oral, Daily    omeprazole (PriLOSEC) 20 mg DR capsule 1 capsule, oral, Daily    potassium chloride CR 10 mEq ER tablet 10 mEq, oral, 2 times daily    valsartan-hydrochlorothiazide (Diovan-HCT) 320-25 mg tablet 1 tablet, oral, Daily       Physical Examination:   GENERAL:  Well developed, well nourished, in no acute  distress.  CHEST:  Symmetric and nontender.  NEURO/PSYCH:  Alert and oriented times three with approppriate behavior and responses.  NECK:  Supple, no JVD, no bruit.  LUNGS:  Clear to auscultation bilaterally, normal respiratory effort.  HEART:  Rate and rhythm regular with no evident murmur, no gallop appreciated.        There are no rubs, clicks or heaves.  EXTREMITIES:  Warm with good color, no clubbing or cyanosis.  There is no edema noted.  PERIPHERAL VASCULAR:  Pulses present and equally palpable; 2+ throughout.      Lab:     CBC:   Lab Results   Component Value Date    WBC 6.9 03/17/2024    RBC 4.50 03/17/2024    HGB 12.6 (L) 03/17/2024    HCT 37.9 (L) 03/17/2024     03/17/2024        CMP:    Lab Results   Component Value Date     (L) 03/17/2024    K 3.8 03/17/2024     03/17/2024    CO2 28 03/17/2024    BUN 23 03/17/2024    CREATININE 0.93 03/17/2024    GLUCOSE 115 (H) 03/17/2024    CALCIUM 8.9 03/17/2024       Lipid Profile:    Lab Results   Component Value Date    TRIG 108 03/04/2024    HDL 45.8 03/04/2024    LDLCALC 118 (H) 03/04/2024       BMP:  Lab Results   Component Value Date     (L) 03/17/2024     03/16/2024     03/15/2024    K 3.8 03/17/2024    K 3.8 03/16/2024    K 3.7 03/15/2024     03/17/2024     03/16/2024     03/15/2024    CO2 28 03/17/2024    CO2 27 03/16/2024    CO2 31 03/15/2024    BUN 23 03/17/2024    BUN 11 03/16/2024    BUN 12 03/15/2024    CREATININE 0.93 03/17/2024    CREATININE 0.60 03/16/2024    CREATININE 0.75 03/15/2024       CBC:  Lab Results   Component Value Date    WBC 6.9 03/17/2024    WBC 9.5 03/16/2024    WBC 7.2 03/15/2024    RBC 4.50 03/17/2024    RBC 4.84 03/16/2024    RBC 5.14 03/15/2024    HGB 12.6 (L) 03/17/2024    HGB 13.4 (L) 03/16/2024    HGB 14.3 03/15/2024    HCT 37.9 (L) 03/17/2024    HCT 40.7 (L) 03/16/2024    HCT 43.0 03/15/2024    MCV 84 03/17/2024    MCV 84 03/16/2024    MCV 84 03/15/2024    MCH 28.0  "03/17/2024    MCH 27.7 03/16/2024    MCH 27.8 03/15/2024    MCHC 33.2 03/17/2024    MCHC 32.9 03/16/2024    MCHC 33.3 03/15/2024    RDW 14.2 03/17/2024    RDW 14.0 03/16/2024    RDW 13.7 03/15/2024     03/17/2024     03/16/2024     03/15/2024       Hepatic Function Panel:    Lab Results   Component Value Date    ALKPHOS 77 03/15/2024    ALT 12 03/15/2024    AST 13 03/15/2024    PROT 7.8 03/15/2024    BILITOT 0.3 03/15/2024    BILIDIR 0.1 07/03/2020       HgBA1c:    No results found for: \"HGBA1C\"    Magnesium:    Lab Results   Component Value Date    MG 2.08 03/17/2024       TSH:    Lab Results   Component Value Date    TSH 1.20 03/15/2024       BNP:   Lab Results   Component Value Date    BNP 66 01/12/2023        PT/INR:    Lab Results   Component Value Date    PROTIME 14.6 (H) 03/31/2023    INR 1.3 (H) 03/31/2023       Cardiac Enzymes:    Lab Results   Component Value Date    TROPHS 10 03/31/2023    TROPHS 10 03/31/2023    TROPHS 8 03/31/2023         Diagnostic Studies:     ECG 12 lead    Result Date: 3/16/2024  Normal sinus rhythm Nonspecific T wave abnormality Abnormal ECG When compared with ECG of 31-MAR-2023 16:10, Previous ECG has undetermined rhythm, needs review Non-specific change in ST segment in Anterior leads See ED provider note for full interpretation and clinical correlation Confirmed by Zoie Prabhakar ([id number]) on 3/16/2024 12:09:36 PM    XR chest 1 view    Result Date: 3/15/2024  Interpreted By:  Peng Davis, STUDY: XR CHEST 1 VIEW;  3/15/2024 3:49 pm   INDICATION: Signs/Symptoms:Elevated blood pressure.   COMPARISON: 03/31/2023.   ACCESSION NUMBER(S): [id number]   ORDERING CLINICIAN: MARII PALAFOX   FINDINGS:   The cardiac silhouette is unremarkable. Costophrenic angles are sharp. Lungs are clear. The trachea is midline. There is no pneumothorax. No acute osseous abnormality is seen.       1.  No active cardiopulmonary process.     Signed by: Peng Davis 3/15/2024 3:58 " PM Dictation workstation:   [id number]      Problem List:     Patient Active Problem List   Diagnosis    Acute cholecystitis    Elevated glucose    Essential hypertension, benign    Gastroesophageal reflux disease    Intellectual disability    Mixed hyperlipidemia    Morbid obesity (Multi)    Varicose veins of lower extremities with ulcer and inflammation (Multi)    Venous (peripheral) insufficiency    Venous stasis    Asymptomatic hypertensive urgency    Hypertensive urgency       Asessment:     Problem List Items Addressed This Visit             ICD-10-CM    Mixed hyperlipidemia E78.2    Relevant Orders    Follow Up In Cardiology    Venous (peripheral) insufficiency I87.2    Relevant Orders    Follow Up In Cardiology    Asymptomatic hypertensive urgency - Primary I16.0    Relevant Orders    Follow Up In Cardiology    Resistant hypertension I1A.0    Relevant Orders    Follow Up In Cardiology

## 2024-07-03 ENCOUNTER — TELEPHONE (OUTPATIENT)
Dept: PRIMARY CARE | Facility: CLINIC | Age: 54
End: 2024-07-03
Payer: MEDICAID

## 2024-07-03 NOTE — TELEPHONE ENCOUNTER
Verbal order received: HOLD meds for this evening.    Rec'd call from Director (Fatemeh Wolfe RN) of [facility] report at [facility] visit today B/P low. Has scheduled B/P meds later this evening. Would like to know should we hold or should an Office visit be scheduled? No parameters provided.    Call to Director to get B/P states 97/44, HR 70 this is not normal.  Pls advise

## 2024-07-03 NOTE — TELEPHONE ENCOUNTER
Received Verbal Order from PCP Hold Evening medications. Call to Fatemeh May Rn, Director. Aware, voiced understanding. Mentions that she needs to talk with brother about pt leg bandages and to schedule future appointment at PCP office.

## 2024-07-29 DIAGNOSIS — I10 ESSENTIAL HYPERTENSION, BENIGN: ICD-10-CM

## 2024-07-30 RX ORDER — AMLODIPINE BESYLATE 10 MG/1
10 TABLET ORAL DAILY
Qty: 90 TABLET | Refills: 1 | Status: SHIPPED | OUTPATIENT
Start: 2024-07-30

## 2024-07-30 RX ORDER — VALSARTAN AND HYDROCHLOROTHIAZIDE 320; 25 MG/1; MG/1
1 TABLET, FILM COATED ORAL DAILY
Qty: 90 TABLET | Refills: 1 | Status: SHIPPED | OUTPATIENT
Start: 2024-07-30

## 2024-08-05 ENCOUNTER — APPOINTMENT (OUTPATIENT)
Dept: PRIMARY CARE | Facility: CLINIC | Age: 54
End: 2024-08-05
Payer: MEDICAID

## 2024-08-05 VITALS
BODY MASS INDEX: 49.44 KG/M2 | HEIGHT: 67 IN | SYSTOLIC BLOOD PRESSURE: 128 MMHG | DIASTOLIC BLOOD PRESSURE: 88 MMHG | WEIGHT: 315 LBS | TEMPERATURE: 97 F | HEART RATE: 68 BPM | OXYGEN SATURATION: 95 %

## 2024-08-05 DIAGNOSIS — E55.9 VITAMIN D DEFICIENCY: ICD-10-CM

## 2024-08-05 DIAGNOSIS — I10 ESSENTIAL HYPERTENSION, BENIGN: Primary | ICD-10-CM

## 2024-08-05 DIAGNOSIS — I1A.0 RESISTANT HYPERTENSION: ICD-10-CM

## 2024-08-05 DIAGNOSIS — E78.2 MIXED HYPERLIPIDEMIA: ICD-10-CM

## 2024-08-05 DIAGNOSIS — E66.01 MORBID OBESITY (MULTI): ICD-10-CM

## 2024-08-05 DIAGNOSIS — F79 INTELLECTUAL DISABILITY: ICD-10-CM

## 2024-08-05 PROCEDURE — 99214 OFFICE O/P EST MOD 30 MIN: CPT | Performed by: FAMILY MEDICINE

## 2024-08-05 PROCEDURE — 3074F SYST BP LT 130 MM HG: CPT | Performed by: FAMILY MEDICINE

## 2024-08-05 PROCEDURE — 3008F BODY MASS INDEX DOCD: CPT | Performed by: FAMILY MEDICINE

## 2024-08-05 PROCEDURE — 3079F DIAST BP 80-89 MM HG: CPT | Performed by: FAMILY MEDICINE

## 2024-08-05 PROCEDURE — 1036F TOBACCO NON-USER: CPT | Performed by: FAMILY MEDICINE

## 2024-08-05 ASSESSMENT — PATIENT HEALTH QUESTIONNAIRE - PHQ9
1. LITTLE INTEREST OR PLEASURE IN DOING THINGS: NOT AT ALL
SUM OF ALL RESPONSES TO PHQ9 QUESTIONS 1 AND 2: 0
2. FEELING DOWN, DEPRESSED OR HOPELESS: NOT AT ALL

## 2024-08-06 DIAGNOSIS — I10 ESSENTIAL HYPERTENSION, BENIGN: ICD-10-CM

## 2024-08-06 NOTE — TELEPHONE ENCOUNTER
Mel from [facility] called in regards to above patient. She stated in the voicemail that the patient has been experiencing low blood pressure readings. 80/50, 90/60. She stated that the patient has not complained of any lightheadedness of dizziness while having the low blood pressures. The patient is currently on Doxazosin and Amlodipine. Mel would like to know if when the patient is experiencing low blood pressures should one of the medication be put on hold. She can be reached at the number provided.

## 2024-08-07 RX ORDER — AMLODIPINE BESYLATE 10 MG/1
5 TABLET ORAL DAILY
Start: 2024-08-07

## 2024-08-08 ENCOUNTER — TELEPHONE (OUTPATIENT)
Dept: CARDIOLOGY | Facility: CLINIC | Age: 54
End: 2024-08-08
Payer: MEDICAID

## 2024-08-08 NOTE — TELEPHONE ENCOUNTER
[facility] sent a fax asking if they can decrease the patient's Amlodipine to 5 mg daily and hold Doxazosin Mesylate for systolic BP less than 100.  Please advise. Thank you.

## 2024-08-10 DIAGNOSIS — I10 ESSENTIAL HYPERTENSION, BENIGN: ICD-10-CM

## 2024-08-12 RX ORDER — CLONIDINE 0.3 MG/24H
PATCH, EXTENDED RELEASE TRANSDERMAL
Qty: 8 PATCH | Refills: 1 | Status: SHIPPED | OUTPATIENT
Start: 2024-08-12

## 2024-08-13 DIAGNOSIS — I16.0 HYPERTENSIVE URGENCY: ICD-10-CM

## 2024-08-13 DIAGNOSIS — I10 ESSENTIAL HYPERTENSION, BENIGN: ICD-10-CM

## 2024-08-13 RX ORDER — ESCITALOPRAM OXALATE 5 MG/1
5 TABLET ORAL DAILY
Qty: 90 TABLET | Refills: 1 | Status: SHIPPED | OUTPATIENT
Start: 2024-08-13

## 2024-08-13 RX ORDER — METOPROLOL SUCCINATE 100 MG/1
100 TABLET, EXTENDED RELEASE ORAL DAILY
Qty: 90 TABLET | Refills: 1 | Status: SHIPPED | OUTPATIENT
Start: 2024-08-13

## 2024-08-13 NOTE — TELEPHONE ENCOUNTER
8/5/2024    [id number] - [place], [place] - [address] [address]  Phone: [phone] Fax: [phone]    Next office visit 11/6/2024

## 2024-09-03 DIAGNOSIS — E87.6 HYPOKALEMIA: ICD-10-CM

## 2024-09-03 RX ORDER — POTASSIUM CHLORIDE 750 MG/1
10 TABLET, EXTENDED RELEASE ORAL 2 TIMES DAILY
Qty: 180 TABLET | Refills: 1 | Status: SHIPPED | OUTPATIENT
Start: 2024-09-03

## 2024-09-03 NOTE — TELEPHONE ENCOUNTER
8/5/2024    [id number] - [place], [place] - [address] [address]  Phone: [phone] Fax: [phone]    Next office visit 11/6/2024

## 2024-10-08 DIAGNOSIS — Z00.00 HEALTHCARE MAINTENANCE: ICD-10-CM

## 2024-10-08 NOTE — TELEPHONE ENCOUNTER
Last OV: 8-5-2024  Pending OV: 11-    Pt called to report needs refill on appetite suppressant.    Call to inform office received message Home Health Nurse (Carissa Chang) [phone]. B.H. verbalized understanding.

## 2024-10-09 RX ORDER — NALTREXONE HYDROCHLORIDE 50 MG/1
50 TABLET, FILM COATED ORAL DAILY
Qty: 90 TABLET | Refills: 1 | Status: SHIPPED | OUTPATIENT
Start: 2024-10-09

## 2024-10-24 DIAGNOSIS — F32.A DEPRESSION, UNSPECIFIED: ICD-10-CM

## 2024-10-27 RX ORDER — BUPROPION HYDROCHLORIDE 150 MG/1
150 TABLET, EXTENDED RELEASE ORAL 2 TIMES DAILY
Qty: 180 TABLET | Refills: 1 | Status: SHIPPED | OUTPATIENT
Start: 2024-10-27

## 2024-11-06 ENCOUNTER — APPOINTMENT (OUTPATIENT)
Dept: PRIMARY CARE | Facility: CLINIC | Age: 54
End: 2024-11-06
Payer: MEDICAID

## 2024-11-08 DIAGNOSIS — K21.9 GASTRO-ESOPHAGEAL REFLUX DISEASE WITHOUT ESOPHAGITIS: ICD-10-CM

## 2024-11-08 RX ORDER — OMEPRAZOLE 20 MG/1
20 CAPSULE, DELAYED RELEASE ORAL DAILY
Qty: 90 CAPSULE | Refills: 1 | Status: SHIPPED | OUTPATIENT
Start: 2024-11-08

## 2024-11-20 ENCOUNTER — TELEPHONE (OUTPATIENT)
Dept: CARDIOLOGY | Facility: CLINIC | Age: 54
End: 2024-11-20

## 2024-11-20 ENCOUNTER — LAB (OUTPATIENT)
Dept: LAB | Facility: LAB | Age: 54
End: 2024-11-20
Payer: MEDICAID

## 2024-11-20 ENCOUNTER — APPOINTMENT (OUTPATIENT)
Dept: PRIMARY CARE | Facility: CLINIC | Age: 54
End: 2024-11-20
Payer: MEDICAID

## 2024-11-20 ENCOUNTER — APPOINTMENT (OUTPATIENT)
Dept: LAB | Facility: LAB | Age: 54
End: 2024-11-20
Payer: MEDICAID

## 2024-11-20 VITALS
HEART RATE: 73 BPM | SYSTOLIC BLOOD PRESSURE: 120 MMHG | WEIGHT: 315 LBS | OXYGEN SATURATION: 94 % | BODY MASS INDEX: 49.44 KG/M2 | DIASTOLIC BLOOD PRESSURE: 84 MMHG | TEMPERATURE: 98.1 F | HEIGHT: 67 IN

## 2024-11-20 DIAGNOSIS — E78.2 MIXED HYPERLIPIDEMIA: ICD-10-CM

## 2024-11-20 DIAGNOSIS — R32 URINARY INCONTINENCE, UNSPECIFIED TYPE: ICD-10-CM

## 2024-11-20 DIAGNOSIS — Z00.00 HEALTHCARE MAINTENANCE: ICD-10-CM

## 2024-11-20 DIAGNOSIS — F79 INTELLECTUAL DISABILITY: Primary | ICD-10-CM

## 2024-11-20 DIAGNOSIS — E55.9 VITAMIN D DEFICIENCY: ICD-10-CM

## 2024-11-20 DIAGNOSIS — I10 ESSENTIAL HYPERTENSION, BENIGN: ICD-10-CM

## 2024-11-20 DIAGNOSIS — E66.01 MORBID OBESITY (MULTI): ICD-10-CM

## 2024-11-20 LAB
ALBUMIN SERPL BCP-MCNC: 3.8 G/DL (ref 3.4–5)
ALP SERPL-CCNC: 81 U/L (ref 33–120)
ALT SERPL W P-5'-P-CCNC: 17 U/L (ref 10–52)
ANION GAP SERPL CALC-SCNC: 9 MMOL/L (ref 10–20)
AST SERPL W P-5'-P-CCNC: 13 U/L (ref 9–39)
BILIRUB SERPL-MCNC: 0.3 MG/DL (ref 0–1.2)
BUN SERPL-MCNC: 12 MG/DL (ref 6–23)
CALCIUM SERPL-MCNC: 9.1 MG/DL (ref 8.6–10.3)
CHLORIDE SERPL-SCNC: 100 MMOL/L (ref 98–107)
CHOLEST SERPL-MCNC: 176 MG/DL (ref 0–199)
CHOLESTEROL/HDL RATIO: 4.3
CO2 SERPL-SCNC: 34 MMOL/L (ref 21–32)
CREAT SERPL-MCNC: 0.87 MG/DL (ref 0.5–1.3)
EGFRCR SERPLBLD CKD-EPI 2021: >90 ML/MIN/1.73M*2
GLUCOSE SERPL-MCNC: 85 MG/DL (ref 74–99)
HDLC SERPL-MCNC: 41.4 MG/DL
LDLC SERPL CALC-MCNC: 99 MG/DL
NON HDL CHOLESTEROL: 135 MG/DL (ref 0–149)
POTASSIUM SERPL-SCNC: 4.4 MMOL/L (ref 3.5–5.3)
PROT SERPL-MCNC: 7 G/DL (ref 6.4–8.2)
SODIUM SERPL-SCNC: 139 MMOL/L (ref 136–145)
TRIGL SERPL-MCNC: 180 MG/DL (ref 0–149)
VLDL: 36 MG/DL (ref 0–40)

## 2024-11-20 PROCEDURE — 1036F TOBACCO NON-USER: CPT | Performed by: FAMILY MEDICINE

## 2024-11-20 PROCEDURE — 80061 LIPID PANEL: CPT

## 2024-11-20 PROCEDURE — 99214 OFFICE O/P EST MOD 30 MIN: CPT | Performed by: FAMILY MEDICINE

## 2024-11-20 PROCEDURE — 80053 COMPREHEN METABOLIC PANEL: CPT

## 2024-11-20 PROCEDURE — 3008F BODY MASS INDEX DOCD: CPT | Performed by: FAMILY MEDICINE

## 2024-11-20 PROCEDURE — 3074F SYST BP LT 130 MM HG: CPT | Performed by: FAMILY MEDICINE

## 2024-11-20 PROCEDURE — 36415 COLL VENOUS BLD VENIPUNCTURE: CPT

## 2024-11-20 PROCEDURE — 3079F DIAST BP 80-89 MM HG: CPT | Performed by: FAMILY MEDICINE

## 2024-11-20 RX ORDER — CLONIDINE 0.3 MG/24H
PATCH, EXTENDED RELEASE TRANSDERMAL
Qty: 8 PATCH | Refills: 1 | Status: SHIPPED | OUTPATIENT
Start: 2024-11-20

## 2024-11-20 RX ORDER — VALSARTAN AND HYDROCHLOROTHIAZIDE 320; 25 MG/1; MG/1
1 TABLET, FILM COATED ORAL DAILY
Qty: 90 TABLET | Refills: 1 | Status: SHIPPED | OUTPATIENT
Start: 2024-11-20

## 2024-11-20 RX ORDER — NALTREXONE HYDROCHLORIDE 50 MG/1
50 TABLET, FILM COATED ORAL DAILY
Qty: 90 TABLET | Refills: 1 | Status: CANCELLED | OUTPATIENT
Start: 2024-11-20

## 2024-11-20 RX ORDER — METOPROLOL SUCCINATE 100 MG/1
100 TABLET, EXTENDED RELEASE ORAL DAILY
Qty: 90 TABLET | Refills: 1 | Status: SHIPPED | OUTPATIENT
Start: 2024-11-20

## 2024-11-20 RX ORDER — NALTREXONE HYDROCHLORIDE 50 MG/1
50 TABLET, FILM COATED ORAL DAILY
Qty: 90 TABLET | Refills: 1 | Status: SHIPPED | OUTPATIENT
Start: 2024-11-20

## 2024-11-20 ASSESSMENT — PATIENT HEALTH QUESTIONNAIRE - PHQ9
2. FEELING DOWN, DEPRESSED OR HOPELESS: NOT AT ALL
1. LITTLE INTEREST OR PLEASURE IN DOING THINGS: NOT AT ALL
SUM OF ALL RESPONSES TO PHQ9 QUESTIONS 1 AND 2: 0

## 2024-11-20 ASSESSMENT — ENCOUNTER SYMPTOMS: DEPRESSION: 0

## 2024-11-20 NOTE — PROGRESS NOTES
"Subjective   Chief complaint: Colt Lewis is a 54 y.o. male who presents for Follow-up (PATIENT HERE FOR 3 MONTH FOLLOW-UP. ).    HPI:  HPI  Chronic disease management.  Active problems noted in [facility].  They are concerned about his urine.  Foul-smelling to the urine.  Incontinence.  Will check urine studies.  Otherwise mood is relatively stable.  Blood pressure is controlled.  Labs reviewed.  Therapeutic.  Acid reflux controlled.  He is on naltrexone.  They are not sure why he is on that.  Will sincere look into why he is on that I wanted to stop it for now has been on it for quite some time to get a go ahead and continue it.  His blood pressure has been controlled.  His mood is stable.  Lifestyle modifications reinforced.  Follow-up in 3 months.  Objective   /84 (BP Location: Left arm, Patient Position: Sitting, BP Cuff Size: Adult)   Pulse 73   Temp 36.7 °C (98.1 °F) (Temporal)   Ht 1.702 m (5' 7\")   Wt (!) 154 kg (340 lb)   SpO2 94%   BMI 53.25 kg/m²   Physical Exam  Vitals and nursing note reviewed.   Constitutional:       Appearance: Normal appearance.   HENT:      Head: Normocephalic and atraumatic.   Eyes:      Extraocular Movements: Extraocular movements intact.      Conjunctiva/sclera: Conjunctivae normal.      Pupils: Pupils are equal, round, and reactive to light.   Cardiovascular:      Rate and Rhythm: Normal rate and regular rhythm.      Heart sounds: Normal heart sounds.   Pulmonary:      Effort: Pulmonary effort is normal.      Breath sounds: Normal breath sounds.   Abdominal:      General: Abdomen is flat. Bowel sounds are normal.      Palpations: Abdomen is soft.   Musculoskeletal:         General: Normal range of motion.   Skin:     General: Skin is warm and dry.   Neurological:      General: No focal deficit present.      Mental Status: He is alert and oriented to person, place, and time. Mental status is at baseline.   Psychiatric:         Mood and Affect: Mood normal.         " Behavior: Behavior normal.       Review of Systems   I have reviewed and reconciled the medication list with the patient today.   Current Outpatient Medications:     amLODIPine (Norvasc) 10 mg tablet, Take 0.5 tablets (5 mg) by mouth once daily., Disp: , Rfl:     buPROPion SR (Wellbutrin SR) 150 mg 12 hr tablet, Take 1 tablet by mouth twice daily., Disp: 180 tablet, Rfl: 1    calcium carb/mag oxide/Cu/zinc (calcium-magnesium-copper-zinc) tablet, Take 1 tablet by mouth once daily., Disp: , Rfl:     cholecalciferol (Vitamin D-3) 25 MCG (1000 UT) tablet, Take 1 tablet (25 mcg) by mouth once daily., Disp: , Rfl:     doxazosin (Cardura) 2 mg tablet, Take 1 tablet (2 mg) by mouth 2 times a day., Disp: 180 tablet, Rfl: 3    escitalopram (Lexapro) 5 mg tablet, Take 1 tablet (5 mg) by mouth once daily., Disp: 90 tablet, Rfl: 1    omeprazole (PriLOSEC) 20 mg DR capsule, Take 1 capsule by mouth once daily., Disp: 90 capsule, Rfl: 1    potassium chloride CR 10 mEq ER tablet, Take 1 tablet (10 mEq) by mouth 2 times a day., Disp: 180 tablet, Rfl: 1    cloNIDine (Catapres-TTS) 0.3 mg/24 hr patch, Apply one patach on the skin and replace every 7 days, as directed, Disp: 8 patch, Rfl: 1    metoprolol succinate XL (Toprol-XL) 100 mg 24 hr tablet, Take 1 tablet (100 mg) by mouth once daily., Disp: 90 tablet, Rfl: 1    miscellaneous medical supply (Blood Pressure Cuff) misc, 1 Device once daily., Disp: 1 each, Rfl: 0    naltrexone (Depade) 50 mg tablet, Take 1 tablet (50 mg) by mouth once daily., Disp: 90 tablet, Rfl: 1    valsartan-hydrochlorothiazide (Diovan-HCT) 320-25 mg tablet, Take 1 tablet by mouth once daily., Disp: 90 tablet, Rfl: 1     Imaging:  No results found.     Labs reviewed:    Lab Results   Component Value Date    WBC 6.9 03/17/2024    HGB 12.6 (L) 03/17/2024    HCT 37.9 (L) 03/17/2024     03/17/2024    CHOL 176 11/20/2024    TRIG 180 (H) 11/20/2024    HDL 41.4 11/20/2024    ALT 17 11/20/2024    AST 13  11/20/2024     11/20/2024    K 4.4 11/20/2024     11/20/2024    CREATININE 0.87 11/20/2024    BUN 12 11/20/2024    CO2 34 (H) 11/20/2024    TSH 1.20 03/15/2024    PSA 0.15 03/04/2024    INR 1.3 (H) 03/31/2023       Assessment/Plan   Problem List Items Addressed This Visit             ICD-10-CM    Essential hypertension, benign I10    Relevant Medications    cloNIDine (Catapres-TTS) 0.3 mg/24 hr patch    valsartan-hydrochlorothiazide (Diovan-HCT) 320-25 mg tablet    metoprolol succinate XL (Toprol-XL) 100 mg 24 hr tablet    Other Relevant Orders    Comprehensive metabolic panel    Intellectual disability - Primary F79    Mixed hyperlipidemia E78.2    Relevant Orders    Comprehensive metabolic panel    Lipid panel    Morbid obesity (Multi) E66.01    Relevant Orders    Comprehensive metabolic panel    Lipid panel     Other Visit Diagnoses         Codes    Healthcare maintenance     Z00.00    Relevant Medications    naltrexone (Depade) 50 mg tablet    Urinary incontinence, unspecified type     R32    Relevant Orders    Urine Culture    Urinalysis with Reflex Microscopic    Vitamin D deficiency     E55.9            Reinforced lifestyle modifications  Continue current medications as listed  Physical activity as tolerated and healthy diet encouraged  Maintain a healthy weight  Follow up in 3mo

## 2024-11-20 NOTE — TELEPHONE ENCOUNTER
Received form from [facility] home care with physician's orders. Placed form in Dr. Sudepe Sanchez MD, FACC basket to be reviewed. Dana BRANDT

## 2024-11-25 ENCOUNTER — LAB (OUTPATIENT)
Dept: LAB | Facility: LAB | Age: 54
End: 2024-11-25
Payer: MEDICAID

## 2024-11-25 DIAGNOSIS — R32 URINARY INCONTINENCE, UNSPECIFIED TYPE: ICD-10-CM

## 2024-11-25 LAB
APPEARANCE UR: CLEAR
BILIRUB UR STRIP.AUTO-MCNC: NEGATIVE MG/DL
COLOR UR: NORMAL
GLUCOSE UR STRIP.AUTO-MCNC: NORMAL MG/DL
KETONES UR STRIP.AUTO-MCNC: NEGATIVE MG/DL
LEUKOCYTE ESTERASE UR QL STRIP.AUTO: NEGATIVE
NITRITE UR QL STRIP.AUTO: NEGATIVE
PH UR STRIP.AUTO: 7.5 [PH]
PROT UR STRIP.AUTO-MCNC: NEGATIVE MG/DL
RBC # UR STRIP.AUTO: NEGATIVE /UL
SP GR UR STRIP.AUTO: 1.01
UROBILINOGEN UR STRIP.AUTO-MCNC: NORMAL MG/DL

## 2024-11-25 PROCEDURE — 87086 URINE CULTURE/COLONY COUNT: CPT

## 2024-11-25 PROCEDURE — 81003 URINALYSIS AUTO W/O SCOPE: CPT

## 2024-11-26 LAB — BACTERIA UR CULT: NORMAL

## 2025-01-31 ENCOUNTER — APPOINTMENT (OUTPATIENT)
Dept: PRIMARY CARE | Facility: CLINIC | Age: 55
End: 2025-01-31
Payer: MEDICAID

## 2025-01-31 VITALS
SYSTOLIC BLOOD PRESSURE: 124 MMHG | HEIGHT: 67 IN | TEMPERATURE: 97.9 F | BODY MASS INDEX: 49.44 KG/M2 | WEIGHT: 315 LBS | HEART RATE: 78 BPM | OXYGEN SATURATION: 91 % | DIASTOLIC BLOOD PRESSURE: 76 MMHG

## 2025-01-31 DIAGNOSIS — I87.2 VENOUS (PERIPHERAL) INSUFFICIENCY: ICD-10-CM

## 2025-01-31 DIAGNOSIS — L03.116 CELLULITIS OF LEFT LOWER LEG: ICD-10-CM

## 2025-01-31 DIAGNOSIS — R32 URINARY INCONTINENCE, UNSPECIFIED TYPE: ICD-10-CM

## 2025-01-31 DIAGNOSIS — L97.929 VARICOSE VEINS OF LOWER EXTREMITIES WITH ULCER AND INFLAMMATION: ICD-10-CM

## 2025-01-31 DIAGNOSIS — I83.229 VARICOSE VEINS OF LOWER EXTREMITIES WITH ULCER AND INFLAMMATION: ICD-10-CM

## 2025-01-31 DIAGNOSIS — I87.2 VENOUS STASIS DERMATITIS OF BOTH LOWER EXTREMITIES: ICD-10-CM

## 2025-01-31 DIAGNOSIS — F79 INTELLECTUAL DISABILITY: ICD-10-CM

## 2025-01-31 DIAGNOSIS — I10 ESSENTIAL HYPERTENSION, BENIGN: Primary | ICD-10-CM

## 2025-01-31 DIAGNOSIS — R35.0 URINARY FREQUENCY: ICD-10-CM

## 2025-01-31 DIAGNOSIS — E66.01 MORBID OBESITY (MULTI): ICD-10-CM

## 2025-01-31 DIAGNOSIS — I87.8 VENOUS STASIS: ICD-10-CM

## 2025-01-31 DIAGNOSIS — I83.219 VARICOSE VEINS OF LOWER EXTREMITIES WITH ULCER AND INFLAMMATION: ICD-10-CM

## 2025-01-31 DIAGNOSIS — N39.44 NOCTURNAL ENURESIS: ICD-10-CM

## 2025-01-31 DIAGNOSIS — L97.919 VARICOSE VEINS OF LOWER EXTREMITIES WITH ULCER AND INFLAMMATION: ICD-10-CM

## 2025-01-31 PROCEDURE — 99214 OFFICE O/P EST MOD 30 MIN: CPT | Performed by: FAMILY MEDICINE

## 2025-01-31 PROCEDURE — 1036F TOBACCO NON-USER: CPT | Performed by: FAMILY MEDICINE

## 2025-01-31 PROCEDURE — 3078F DIAST BP <80 MM HG: CPT | Performed by: FAMILY MEDICINE

## 2025-01-31 PROCEDURE — 3074F SYST BP LT 130 MM HG: CPT | Performed by: FAMILY MEDICINE

## 2025-01-31 PROCEDURE — 3008F BODY MASS INDEX DOCD: CPT | Performed by: FAMILY MEDICINE

## 2025-01-31 RX ORDER — DOXYCYCLINE 100 MG/1
100 CAPSULE ORAL 2 TIMES DAILY
Qty: 20 CAPSULE | Refills: 0 | Status: SHIPPED | OUTPATIENT
Start: 2025-01-31 | End: 2025-02-10

## 2025-01-31 ASSESSMENT — PATIENT HEALTH QUESTIONNAIRE - PHQ9
1. LITTLE INTEREST OR PLEASURE IN DOING THINGS: NOT AT ALL
2. FEELING DOWN, DEPRESSED OR HOPELESS: NOT AT ALL
SUM OF ALL RESPONSES TO PHQ9 QUESTIONS 1 AND 2: 0

## 2025-01-31 NOTE — PROGRESS NOTES
"Subjective   Chief complaint: Colt Lewis is a 54 y.o. male who presents for Follow-up (3 month follow up//Patient would like to discuss urine results from November. Also the patients left leg is open and weeping).    HPI:  HPI  Patient presents today accompanied by a family member friend.  His brother is not currently here in the office with him he was at his last office visit.  Although she communicates with information provided by her brother.  Patient does have a history of intellectual disability.  They are concerned about urinary incontinence.  We had evaluated in November but urine studies were unremarkable.  Laboratory studies were unremarkable.  But has been persistent.  Frequency urgency as well as nocturia and nocturnal enuresis.  Will proceed with urology consultation.  In addition he is being followed for venous stasis and venous insufficiency.  He has home health come Monday Wednesday Friday for compression wraps.  But right now there is an increased area on the left lower leg of induration warmth erythema and tenderness.  Will put him on an antibiotic.  Discussed continued compression and elevation.  Continued home health wound care.  Continue other current medical therapy in the interim.  Objective   /76 (BP Location: Right arm, Patient Position: Sitting, BP Cuff Size: Large adult)   Pulse 78   Temp 36.6 °C (97.9 °F) (Temporal)   Ht 1.702 m (5' 7\")   Wt (!) 157 kg (346 lb)   SpO2 91%   BMI 54.19 kg/m²   Physical Exam  Vitals and nursing note reviewed.   Constitutional:       Appearance: Normal appearance.   HENT:      Head: Normocephalic and atraumatic.   Eyes:      Extraocular Movements: Extraocular movements intact.      Conjunctiva/sclera: Conjunctivae normal.      Pupils: Pupils are equal, round, and reactive to light.   Cardiovascular:      Rate and Rhythm: Normal rate and regular rhythm.      Heart sounds: Normal heart sounds.   Pulmonary:      Effort: Pulmonary effort is " normal.      Breath sounds: Normal breath sounds.   Abdominal:      General: Abdomen is flat. Bowel sounds are normal.      Palpations: Abdomen is soft.   Musculoskeletal:         General: Normal range of motion.   Skin:     General: Skin is warm and dry.   Neurological:      General: No focal deficit present.      Mental Status: He is alert and oriented to person, place, and time. Mental status is at baseline.   Psychiatric:         Mood and Affect: Mood normal.         Behavior: Behavior normal.       Review of Systems   I have reviewed and reconciled the medication list with the patient today.   Current Outpatient Medications:     amLODIPine (Norvasc) 10 mg tablet, Take 0.5 tablets (5 mg) by mouth once daily., Disp: , Rfl:     buPROPion SR (Wellbutrin SR) 150 mg 12 hr tablet, Take 1 tablet by mouth twice daily., Disp: 180 tablet, Rfl: 1    calcium carb/mag oxide/Cu/zinc (calcium-magnesium-copper-zinc) tablet, Take 1 tablet by mouth once daily., Disp: , Rfl:     cholecalciferol (Vitamin D-3) 25 MCG (1000 UT) tablet, Take 1 tablet (25 mcg) by mouth once daily., Disp: , Rfl:     cloNIDine (Catapres-TTS) 0.3 mg/24 hr patch, Apply one patach on the skin and replace every 7 days, as directed, Disp: 8 patch, Rfl: 1    doxazosin (Cardura) 2 mg tablet, TAKE 1 TABLET BY MOUTH TWICE DAILY, Disp: 180 tablet, Rfl: 3    escitalopram (Lexapro) 5 mg tablet, Take 1 tablet (5 mg) by mouth once daily., Disp: 90 tablet, Rfl: 1    metoprolol succinate XL (Toprol-XL) 100 mg 24 hr tablet, Take 1 tablet (100 mg) by mouth once daily., Disp: 90 tablet, Rfl: 1    miscellaneous medical supply (Blood Pressure Cuff) misc, 1 Device once daily., Disp: 1 each, Rfl: 0    naltrexone (Depade) 50 mg tablet, Take 1 tablet (50 mg) by mouth once daily., Disp: 90 tablet, Rfl: 1    omeprazole (PriLOSEC) 20 mg DR capsule, Take 1 capsule by mouth once daily., Disp: 90 capsule, Rfl: 1    potassium chloride CR 10 mEq ER tablet, Take 1 tablet (10 mEq) by  mouth 2 times a day., Disp: 180 tablet, Rfl: 1    valsartan-hydrochlorothiazide (Diovan-HCT) 320-25 mg tablet, Take 1 tablet by mouth once daily., Disp: 90 tablet, Rfl: 1    doxycycline (Vibramycin) 100 mg capsule, Take 1 capsule (100 mg) by mouth 2 times a day for 10 days. Take with at least 8 ounces (large glass) of water, do not lie down for 30 minutes after, Disp: 20 capsule, Rfl: 0     Imaging:  No results found.     Labs reviewed:    Lab Results   Component Value Date    WBC 6.9 03/17/2024    HGB 12.6 (L) 03/17/2024    HCT 37.9 (L) 03/17/2024     03/17/2024    CHOL 176 11/20/2024    TRIG 180 (H) 11/20/2024    HDL 41.4 11/20/2024    ALT 17 11/20/2024    AST 13 11/20/2024     11/20/2024    K 4.4 11/20/2024     11/20/2024    CREATININE 0.87 11/20/2024    BUN 12 11/20/2024    CO2 34 (H) 11/20/2024    TSH 1.20 03/15/2024    PSA 0.15 03/04/2024    INR 1.3 (H) 03/31/2023       Assessment/Plan   Problem List Items Addressed This Visit             ICD-10-CM    Essential hypertension, benign - Primary I10    Intellectual disability F79    Morbid obesity (Multi) E66.01    Varicose veins of lower extremities with ulcer and inflammation I83.229, I83.219, L97.919, L97.929    Venous (peripheral) insufficiency I87.2    Venous stasis I87.8     Other Visit Diagnoses         Codes    Urinary incontinence, unspecified type     R32    Relevant Orders    Referral to Urology    Urinary frequency     R35.0    Relevant Orders    Referral to Urology    Nocturnal enuresis     N39.44    Relevant Orders    Referral to Urology    Venous stasis dermatitis of both lower extremities     I87.2    Cellulitis of left lower leg     L03.116    Relevant Medications    doxycycline (Vibramycin) 100 mg capsule            Reinforced lifestyle modifications  Continue current medications as listed  Physical activity as tolerated and healthy diet encouraged  Maintain a healthy weight  Follow up in

## 2025-02-20 DIAGNOSIS — I16.0 HYPERTENSIVE URGENCY: ICD-10-CM

## 2025-02-20 DIAGNOSIS — E87.6 HYPOKALEMIA: ICD-10-CM

## 2025-02-20 RX ORDER — POTASSIUM CHLORIDE 750 MG/1
10 TABLET, EXTENDED RELEASE ORAL 2 TIMES DAILY
Qty: 180 TABLET | Refills: 1 | Status: SHIPPED | OUTPATIENT
Start: 2025-02-20

## 2025-02-20 RX ORDER — ESCITALOPRAM OXALATE 5 MG/1
5 TABLET ORAL DAILY
Qty: 90 TABLET | Refills: 1 | Status: SHIPPED | OUTPATIENT
Start: 2025-02-20

## 2025-03-03 ENCOUNTER — TELEPHONE (OUTPATIENT)
Dept: PRIMARY CARE | Facility: CLINIC | Age: 55
End: 2025-03-03
Payer: MEDICAID

## 2025-03-03 NOTE — TELEPHONE ENCOUNTER
Janie from [facility] home care called office today requesting the okay to use Coban on Lower Left Leg wound. Patient is coming in to see them soon. They have seen patient and treated his wounds for quite some time now. Janie advises that when wounds are not open they are able to use ace wraps with calcuim Algant and coban when wounds are open. Wounds have been well maintained, the dressings get changed on M,W, F, Please advise.

## 2025-03-04 NOTE — TELEPHONE ENCOUNTER
Call placed to [facility] phone rings busy. Unable to leave [facility]

## 2025-03-05 ENCOUNTER — APPOINTMENT (OUTPATIENT)
Dept: PRIMARY CARE | Facility: CLINIC | Age: 55
End: 2025-03-05
Payer: MEDICAID

## 2025-03-05 VITALS
DIASTOLIC BLOOD PRESSURE: 76 MMHG | OXYGEN SATURATION: 97 % | WEIGHT: 315 LBS | HEART RATE: 93 BPM | SYSTOLIC BLOOD PRESSURE: 126 MMHG | HEIGHT: 67 IN | TEMPERATURE: 98 F | BODY MASS INDEX: 49.44 KG/M2

## 2025-03-05 DIAGNOSIS — I87.2 VENOUS (PERIPHERAL) INSUFFICIENCY: ICD-10-CM

## 2025-03-05 DIAGNOSIS — L97.929 VARICOSE VEINS OF LOWER EXTREMITIES WITH ULCER AND INFLAMMATION: ICD-10-CM

## 2025-03-05 DIAGNOSIS — L03.116 CELLULITIS OF LEFT LOWER LEG: ICD-10-CM

## 2025-03-05 DIAGNOSIS — I87.2 VENOUS STASIS DERMATITIS OF BOTH LOWER EXTREMITIES: ICD-10-CM

## 2025-03-05 DIAGNOSIS — I10 ESSENTIAL HYPERTENSION, BENIGN: Primary | ICD-10-CM

## 2025-03-05 DIAGNOSIS — F79 INTELLECTUAL DISABILITY: ICD-10-CM

## 2025-03-05 DIAGNOSIS — I83.229 VARICOSE VEINS OF LOWER EXTREMITIES WITH ULCER AND INFLAMMATION: ICD-10-CM

## 2025-03-05 DIAGNOSIS — I83.219 VARICOSE VEINS OF LOWER EXTREMITIES WITH ULCER AND INFLAMMATION: ICD-10-CM

## 2025-03-05 DIAGNOSIS — E66.01 MORBID OBESITY (MULTI): ICD-10-CM

## 2025-03-05 DIAGNOSIS — I87.8 VENOUS STASIS: ICD-10-CM

## 2025-03-05 DIAGNOSIS — L97.919 VARICOSE VEINS OF LOWER EXTREMITIES WITH ULCER AND INFLAMMATION: ICD-10-CM

## 2025-03-05 PROCEDURE — 99214 OFFICE O/P EST MOD 30 MIN: CPT | Performed by: FAMILY MEDICINE

## 2025-03-05 PROCEDURE — 3078F DIAST BP <80 MM HG: CPT | Performed by: FAMILY MEDICINE

## 2025-03-05 PROCEDURE — 3008F BODY MASS INDEX DOCD: CPT | Performed by: FAMILY MEDICINE

## 2025-03-05 PROCEDURE — 3074F SYST BP LT 130 MM HG: CPT | Performed by: FAMILY MEDICINE

## 2025-03-05 PROCEDURE — 1036F TOBACCO NON-USER: CPT | Performed by: FAMILY MEDICINE

## 2025-03-05 RX ORDER — DOXYCYCLINE 100 MG/1
100 CAPSULE ORAL 2 TIMES DAILY
Qty: 20 CAPSULE | Refills: 0 | Status: SHIPPED | OUTPATIENT
Start: 2025-03-05 | End: 2025-03-15

## 2025-03-05 NOTE — PROGRESS NOTES
"Subjective   Chief complaint: Colt Lewis is a 54 y.o. male who presents for Edema (Left leg swelling and red. [facility] is wrapping legs three times a week. [facility] Nurse asking for Coban RX.).    HPI:  HPI  Presents accompanied by family member.  He has a history of electrical disability.  He follows with home health.  He has venous stasis dermatitis of his left lower extremity.  Back in January had an evidence an episode of cellulitis.  He completed an antibiotic.  Now once again its red warm swollen tender with mucopurulent debris and drainage.  I took the bandage down.  He does have evidence of cellulitis and chronic venous stasis dermatitis and venous insufficiency.  Organ to put him on an antibiotic.  Wound center consultation.  Any in the meantime compression elevation.  Continue with home health care wound care in the interim.    Noted.  Short-term follow-up as scheduled.  Objective   /76 (BP Location: Left arm, Patient Position: Sitting)   Pulse 93   Temp 36.7 °C (98 °F) (Temporal)   Ht 1.702 m (5' 7\")   Wt (!) 158 kg (349 lb 3.2 oz)   SpO2 97% Comment: ra  BMI 54.69 kg/m²   Physical Exam  Review of Systems   I have reviewed and reconciled the medication list with the patient today.   Current Outpatient Medications:     amLODIPine (Norvasc) 10 mg tablet, Take 0.5 tablets (5 mg) by mouth once daily., Disp: , Rfl:     buPROPion SR (Wellbutrin SR) 150 mg 12 hr tablet, Take 1 tablet by mouth twice daily., Disp: 180 tablet, Rfl: 1    calcium carb/mag oxide/Cu/zinc (calcium-magnesium-copper-zinc) tablet, Take 1 tablet by mouth once daily., Disp: , Rfl:     cholecalciferol (Vitamin D-3) 25 MCG (1000 UT) tablet, Take 1 tablet (25 mcg) by mouth once daily., Disp: , Rfl:     cloNIDine (Catapres-TTS) 0.3 mg/24 hr patch, Apply one patach on the skin and replace every 7 days, as directed, Disp: 8 patch, Rfl: 1    doxazosin (Cardura) 2 mg tablet, TAKE 1 TABLET BY MOUTH TWICE DAILY, Disp: 180 tablet, Rfl: 3    " escitalopram (Lexapro) 5 mg tablet, TAKE 1 TABLET BY MOUTH ONCE DAILY, Disp: 90 tablet, Rfl: 1    metoprolol succinate XL (Toprol-XL) 100 mg 24 hr tablet, Take 1 tablet (100 mg) by mouth once daily., Disp: 90 tablet, Rfl: 1    miscellaneous medical supply (Blood Pressure Cuff) misc, 1 Device once daily., Disp: 1 each, Rfl: 0    naltrexone (Depade) 50 mg tablet, Take 1 tablet (50 mg) by mouth once daily., Disp: 90 tablet, Rfl: 1    omeprazole (PriLOSEC) 20 mg DR capsule, Take 1 capsule by mouth once daily., Disp: 90 capsule, Rfl: 1    potassium chloride CR 10 mEq ER tablet, Take 1 tablet (10 mEq) by mouth 2 times a day., Disp: 180 tablet, Rfl: 1    valsartan-hydrochlorothiazide (Diovan-HCT) 320-25 mg tablet, Take 1 tablet by mouth once daily., Disp: 90 tablet, Rfl: 1    doxycycline (Vibramycin) 100 mg capsule, Take 1 capsule (100 mg) by mouth 2 times a day for 10 days. Take with at least 8 ounces (large glass) of water, do not lie down for 30 minutes after, Disp: 20 capsule, Rfl: 0     Imaging:  No results found.     Labs reviewed:    Lab Results   Component Value Date    WBC 6.9 03/17/2024    HGB 12.6 (L) 03/17/2024    HCT 37.9 (L) 03/17/2024     03/17/2024    CHOL 176 11/20/2024    TRIG 180 (H) 11/20/2024    HDL 41.4 11/20/2024    ALT 17 11/20/2024    AST 13 11/20/2024     11/20/2024    K 4.4 11/20/2024     11/20/2024    CREATININE 0.87 11/20/2024    BUN 12 11/20/2024    CO2 34 (H) 11/20/2024    TSH 1.20 03/15/2024    PSA 0.15 03/04/2024    INR 1.3 (H) 03/31/2023       Assessment/Plan   Problem List Items Addressed This Visit             ICD-10-CM    Essential hypertension, benign - Primary I10    Intellectual disability F79    Relevant Medications    doxycycline (Vibramycin) 100 mg capsule    Other Relevant Orders    Referral to Wound Clinic    Morbid obesity (Multi) E66.01    Relevant Medications    doxycycline (Vibramycin) 100 mg capsule    Other Relevant Orders    Referral to Wound Clinic     Varicose veins of lower extremities with ulcer and inflammation I83.229, I83.219, L97.919, L97.929    Relevant Medications    doxycycline (Vibramycin) 100 mg capsule    Other Relevant Orders    Referral to Wound Clinic    Venous (peripheral) insufficiency I87.2    Relevant Medications    doxycycline (Vibramycin) 100 mg capsule    Other Relevant Orders    Referral to Wound Clinic    Venous stasis I87.8    Relevant Medications    doxycycline (Vibramycin) 100 mg capsule    Other Relevant Orders    Referral to Wound Clinic     Other Visit Diagnoses         Codes    Cellulitis of left lower leg     L03.116    Relevant Medications    doxycycline (Vibramycin) 100 mg capsule    Other Relevant Orders    Referral to Wound Clinic    Venous stasis dermatitis of both lower extremities     I87.2    Relevant Medications    doxycycline (Vibramycin) 100 mg capsule    Other Relevant Orders    Referral to Wound Clinic            Reinforced lifestyle modifications  Continue current medications as listed  Physical activity as tolerated and healthy diet encouraged  Maintain a healthy weight  Follow up in 3-4 weeks

## 2025-03-06 NOTE — TELEPHONE ENCOUNTER
Call placed to [facility] unable to leave [facility]. Phone continues to ring busy. Patient referred to wound care at 3/5/25 visit

## 2025-03-07 ENCOUNTER — APPOINTMENT (OUTPATIENT)
Dept: WOUND CARE | Facility: CLINIC | Age: 55
End: 2025-03-07
Payer: MEDICAID

## 2025-03-07 NOTE — TELEPHONE ENCOUNTER
Call placed to [facility] unable to leave [facility]. Phone continues to ring busy. Patient referred to wound care at 3/5/25 visit     Call placed to patient to notify unable to reach [facility]. No alternative numbers provided by patient. Patient states he has appointment Monday 3/10/25 with Wound Care

## 2025-03-10 ENCOUNTER — APPOINTMENT (OUTPATIENT)
Dept: WOUND CARE | Facility: CLINIC | Age: 55
End: 2025-03-10
Payer: MEDICAID

## 2025-03-11 ENCOUNTER — TELEPHONE (OUTPATIENT)
Dept: PRIMARY CARE | Facility: CLINIC | Age: 55
End: 2025-03-11
Payer: MEDICAID

## 2025-03-11 NOTE — TELEPHONE ENCOUNTER
Mel from [facility] called office today advising that the wound care appointment was canceled yesterday due to provide a ride not showing up to  patient. and rescheduled for today. She checked the wounds out yesterday and redressed the area. Patient wound is now scabbed over and is not draining. Mel is asking if patient should still go see wound care or if the patient should cancel.    Ni call back is   [phone]

## 2025-03-12 ENCOUNTER — APPOINTMENT (OUTPATIENT)
Dept: UROLOGY | Facility: CLINIC | Age: 55
End: 2025-03-12
Payer: MEDICAID

## 2025-03-12 ENCOUNTER — OFFICE VISIT (OUTPATIENT)
Dept: WOUND CARE | Facility: CLINIC | Age: 55
End: 2025-03-12
Payer: MEDICAID

## 2025-03-12 DIAGNOSIS — I87.8 VENOUS STASIS: ICD-10-CM

## 2025-03-12 DIAGNOSIS — L97.919 VARICOSE VEINS OF LOWER EXTREMITIES WITH ULCER AND INFLAMMATION: ICD-10-CM

## 2025-03-12 DIAGNOSIS — I87.2 VENOUS STASIS DERMATITIS OF BOTH LOWER EXTREMITIES: ICD-10-CM

## 2025-03-12 DIAGNOSIS — I83.219 VARICOSE VEINS OF LOWER EXTREMITIES WITH ULCER AND INFLAMMATION: ICD-10-CM

## 2025-03-12 DIAGNOSIS — F79 INTELLECTUAL DISABILITY: ICD-10-CM

## 2025-03-12 DIAGNOSIS — I83.229 VARICOSE VEINS OF LOWER EXTREMITIES WITH ULCER AND INFLAMMATION: ICD-10-CM

## 2025-03-12 DIAGNOSIS — E66.01 MORBID OBESITY (MULTI): ICD-10-CM

## 2025-03-12 DIAGNOSIS — L03.116 CELLULITIS OF LEFT LOWER LEG: ICD-10-CM

## 2025-03-12 DIAGNOSIS — L97.929 VARICOSE VEINS OF LOWER EXTREMITIES WITH ULCER AND INFLAMMATION: ICD-10-CM

## 2025-03-12 DIAGNOSIS — I87.2 VENOUS (PERIPHERAL) INSUFFICIENCY: ICD-10-CM

## 2025-03-12 PROCEDURE — 99213 OFFICE O/P EST LOW 20 MIN: CPT | Mod: 25

## 2025-03-12 PROCEDURE — 11042 DBRDMT SUBQ TIS 1ST 20SQCM/<: CPT

## 2025-03-17 ENCOUNTER — APPOINTMENT (OUTPATIENT)
Dept: PRIMARY CARE | Facility: CLINIC | Age: 55
End: 2025-03-17
Payer: MEDICAID

## 2025-03-17 ENCOUNTER — TELEPHONE (OUTPATIENT)
Dept: PRIMARY CARE | Facility: CLINIC | Age: 55
End: 2025-03-17

## 2025-03-19 ENCOUNTER — OFFICE VISIT (OUTPATIENT)
Dept: WOUND CARE | Facility: CLINIC | Age: 55
End: 2025-03-19
Payer: MEDICAID

## 2025-03-19 PROCEDURE — 99213 OFFICE O/P EST LOW 20 MIN: CPT

## 2025-03-31 DIAGNOSIS — I10 ESSENTIAL HYPERTENSION, BENIGN: ICD-10-CM

## 2025-03-31 RX ORDER — CLONIDINE 0.3 MG/24H
PATCH, EXTENDED RELEASE TRANSDERMAL
Qty: 8 PATCH | Refills: 1 | Status: SHIPPED | OUTPATIENT
Start: 2025-03-31

## 2025-04-09 ENCOUNTER — APPOINTMENT (OUTPATIENT)
Dept: UROLOGY | Facility: CLINIC | Age: 55
End: 2025-04-09
Payer: MEDICAID

## 2025-04-09 VITALS
SYSTOLIC BLOOD PRESSURE: 138 MMHG | TEMPERATURE: 98.1 F | HEART RATE: 71 BPM | WEIGHT: 315 LBS | DIASTOLIC BLOOD PRESSURE: 80 MMHG | BODY MASS INDEX: 54.03 KG/M2

## 2025-04-09 DIAGNOSIS — N39.44 NOCTURNAL ENURESIS: ICD-10-CM

## 2025-04-09 DIAGNOSIS — R32 URINARY INCONTINENCE, UNSPECIFIED TYPE: Primary | ICD-10-CM

## 2025-04-09 DIAGNOSIS — R35.0 URINARY FREQUENCY: ICD-10-CM

## 2025-04-09 DIAGNOSIS — N32.81 OVERACTIVE BLADDER: ICD-10-CM

## 2025-04-09 LAB
POC APPEARANCE, URINE: CLEAR
POC BILIRUBIN, URINE: NEGATIVE
POC BLOOD, URINE: NEGATIVE
POC COLOR, URINE: YELLOW
POC GLUCOSE, URINE: NEGATIVE MG/DL
POC KETONES, URINE: NEGATIVE MG/DL
POC LEUKOCYTES, URINE: NEGATIVE
POC NITRITE,URINE: NEGATIVE
POC PH, URINE: 7 PH
POC PROTEIN, URINE: NEGATIVE MG/DL
POC SPECIFIC GRAVITY, URINE: 1.01
POC UROBILINOGEN, URINE: 0.2 EU/DL

## 2025-04-09 PROCEDURE — 3075F SYST BP GE 130 - 139MM HG: CPT | Performed by: STUDENT IN AN ORGANIZED HEALTH CARE EDUCATION/TRAINING PROGRAM

## 2025-04-09 PROCEDURE — 81003 URINALYSIS AUTO W/O SCOPE: CPT | Performed by: STUDENT IN AN ORGANIZED HEALTH CARE EDUCATION/TRAINING PROGRAM

## 2025-04-09 PROCEDURE — 1036F TOBACCO NON-USER: CPT | Performed by: STUDENT IN AN ORGANIZED HEALTH CARE EDUCATION/TRAINING PROGRAM

## 2025-04-09 PROCEDURE — 99204 OFFICE O/P NEW MOD 45 MIN: CPT | Performed by: STUDENT IN AN ORGANIZED HEALTH CARE EDUCATION/TRAINING PROGRAM

## 2025-04-09 PROCEDURE — 3079F DIAST BP 80-89 MM HG: CPT | Performed by: STUDENT IN AN ORGANIZED HEALTH CARE EDUCATION/TRAINING PROGRAM

## 2025-04-09 RX ORDER — MIRABEGRON 25 MG/1
25 TABLET, FILM COATED, EXTENDED RELEASE ORAL DAILY
Qty: 30 TABLET | Refills: 3 | Status: SHIPPED | OUTPATIENT
Start: 2025-04-09

## 2025-04-09 NOTE — PROGRESS NOTES
Scribed for Dr. Redd Wooten by Chris Rhodes. I, Dr. Redd Wooten have personally reviewed and agreed with the information entered by the Virtual Scribe. 25.    ASSESSMENT:  Problem List Items Addressed This Visit    None  Visit Diagnoses       Urinary incontinence, unspecified type    -  Primary    Relevant Medications    mirabegron (Myrbetriq) 25 mg tablet extended release 24 hr    Other Relevant Orders    POCT UA Automated manually resulted (Completed)    Urinary frequency        Relevant Medications    mirabegron (Myrbetriq) 25 mg tablet extended release 24 hr    Other Relevant Orders    Post-Void Residual (Completed)    Nocturnal enuresis        Overactive bladder        Relevant Medications    mirabegron (Myrbetriq) 25 mg tablet extended release 24 hr           PLAN:  #OAB symptoms  #Urinary incontinence  #Nocturnal enuresis  Discussed options, and elects to trial course of Myrbetriq for his OAB.   Risks, benefits, and alternatives discussed.   Can continue doxazosin 2 mg BID as well.   RTC in 3 months for reassessment and a med check.     Discussion:  Discussed AUA guidelines for overactive bladder symptoms (OAB). Reviewed behavioral medications, medications, and third-line procedures. Patient agrees to proceed with behavioral modifications, timed voids, and monitoring bladder irritants. We discussed treatment options including anticholinergics, myrbetriq, bladder botox and PTNS. Risks, benefits and complications discussed.     We discussed behavioral modifications that can contribute to lessening irritative symptoms, namely evening fluid restriction, elevating one's legs for a while before bedtime, voiding right before bedtime, and avoiding bladder irritants like caffeine, carbonated beverages, dark-colored beverages, artificial sweeteners, acidic foods and beverages, and spicy foods. In addition, advised the followin) Avoid caffeine in the afternoon or evening, 2) Avoid/restrict fluids  altogether after 6 pm (or at least 3 hours prior to bedtime).    We discussed the risks of Myrbetriq which include elevated blood pressure, incomplete bladder emptying, sinus pain, dry mouth, sore throat, joint pain, diarrhea, constipation, bloating and anxiety. I explained to the patient that Myrbetriq can take up to 2-3 weeks before they see a change in their urinary symptoms.     All questions were answered to the patient’s satisfaction.  Patient agrees with the plan and wishes to proceed.  Continue follow-up for ongoing care of his chronic medical conditions.       History of Present Illness (HPI):  Colt presents as a new patient for an evaluation.  The patient’s EMR has been reviewed.  Lives in [place], [place].   Hx of developmental delay, and morbid obesity.  Assisted by his cousin today, who relays information from his brother who is not present    Hx of OAB symptoms with daytime frequency, nocturia, and nocturnal enuresis.   Referred by his PCP, Dr. Medel to establish urologic care.   Continues on doxazosin 2mg BID for this OAB.     TODAY: (04/09/25)  C/o ongoing urinary frequency, urgency, and leakage.   States his leakage seems to be worse at night.   His daytime frequency waxes and wanes.   Denies any obstructive urinary issues.   States his stream is strong and feels he empties well.   Denies any recent UTI's or hx of gross hematuria.   IO urinalysis negative for blood or infection.  PVR: 0 mL  Admits to drinking tea drinks at night    PMH: HTN, intellectual disability, GERD, morbid obesity, PVI/varicose veins of lower extremities, venous stasis dermatitis (LLE)  PSH: cholecystectomy  FH: colon cancer (Mother); denies FH of  malignancy.   SH: Non-smoker, never smoked.     Past Medical History:   Diagnosis Date    Cholecystitis, acute     Gastroesophageal reflux disease 02/11/2009    Hypertension     Intellectual disability 05/10/2018    Mixed hyperlipidemia 12/13/2023    Morbid obesity with body mass  index (BMI) of 40.0 or higher (Multi)     Resistant hypertension     Venous (peripheral) insufficiency 12/13/2023    Formatting of this note might be different from the original. chronic swelling of LE; w venous stasis dermatitis     Past Surgical History:   Procedure Laterality Date    CHOLECYSTECTOMY  2020    COLONOSCOPY  07/13/2023     Family History   Problem Relation Name Age of Onset    Hypertension Mother      Colon cancer Mother      Hypertension Father       Social History     Tobacco Use   Smoking Status Never    Passive exposure: Never   Smokeless Tobacco Never     Current Outpatient Medications   Medication Sig Dispense Refill    amLODIPine (Norvasc) 10 mg tablet Take 0.5 tablets (5 mg) by mouth once daily.      buPROPion SR (Wellbutrin SR) 150 mg 12 hr tablet Take 1 tablet by mouth twice daily. 180 tablet 1    calcium carb/mag oxide/Cu/zinc (calcium-magnesium-copper-zinc) tablet Take 1 tablet by mouth once daily.      cholecalciferol (Vitamin D-3) 25 MCG (1000 UT) tablet Take 1 tablet (25 mcg) by mouth once daily.      cloNIDine (Catapres-TTS) 0.3 mg/24 hr patch Apply one patach on the skin and replace every 7 days, as directed 8 patch 1    doxazosin (Cardura) 2 mg tablet TAKE 1 TABLET BY MOUTH TWICE DAILY 180 tablet 3    escitalopram (Lexapro) 5 mg tablet TAKE 1 TABLET BY MOUTH ONCE DAILY 90 tablet 1    metoprolol succinate XL (Toprol-XL) 100 mg 24 hr tablet Take 1 tablet (100 mg) by mouth once daily. 90 tablet 1    miscellaneous medical supply (Blood Pressure Cuff) misc 1 Device once daily. 1 each 0    naltrexone (Depade) 50 mg tablet Take 1 tablet (50 mg) by mouth once daily. 90 tablet 1    omeprazole (PriLOSEC) 20 mg DR capsule Take 1 capsule by mouth once daily. 90 capsule 1    potassium chloride CR 10 mEq ER tablet Take 1 tablet (10 mEq) by mouth 2 times a day. 180 tablet 1    valsartan-hydrochlorothiazide (Diovan-HCT) 320-25 mg tablet Take 1 tablet by mouth once daily. 90 tablet 1     No current  facility-administered medications for this visit.     Allergies   Allergen Reactions    Penicillins Hives    Sulfamethoxazole-Trimethoprim Rash     Past medical, surgical, family and social history in the chart was reviewed and is accurate including any additions to what is in this HPI.    REVIEW OF SYSTEMS (ROS):   Constitutional: denies any unintentional weight loss or change in strength.  Integumentary: denies any rashes or pruritus.  Eyes: denies any double vision or eye pain.  Ear/Nose/Mouth/Throat: denies any nosebleeds or gum bleeds.  Cardiovascular: denies any chest pain or syncope.  Respiratory: denies hemoptysis.  Gastrointestinal: denies nausea or vomiting.  Musculoskeletal: denies muscle cramping or weakness.  Neurologic: denies convulsions or seizures.  Hematologic/Lymphatic: denies bleeding tendencies.  Endocrine: denies heat/cold intolerance.  All other systems have been reviewed and are negative unless otherwise noted in the HPI.     OBJECTIVE:  Visit Vitals  /80   Pulse 71   Temp 36.7 °C (98.1 °F) (Temporal)     PHYSICAL EXAM:  Constitutional: No obvious distress.  Eyes: Non-injected conjunctiva, sclera clear, EOMI.  Ears/Nose/Mouth/Throat: No obvious drainage per ears or nose.  Cardiovascular: Extremities are warm and well perfused. No edema, cyanosis or pallor.  Respiratory: No audible wheezing/stridor; respirations do not appear labored.  Gastrointestinal: Abdomen soft, not distended.  Musculoskeletal: Normal ROM of extremities.  Skin: No obvious rashes or open sores.  Neurologic: Alert and oriented, CN 2-12 grossly intact.  Psychiatric: Answers questions appropriately with normal affect.  Hematologic/Lymphatic/Immunologic: No obvious bruises or sites of spontaneous bleeding.  Genitourinary: No CVA tenderness, bladder not palpable.     LABS & IMAGING:  Basic Labs:  Lab Results   Component Value Date    WBC 6.9 03/17/2024    HGB 12.6 (L) 03/17/2024    HCT 37.9 (L) 03/17/2024      03/17/2024     11/20/2024    K 4.4 11/20/2024     11/20/2024    ALT 17 11/20/2024    AST 13 11/20/2024    CREATININE 0.87 11/20/2024    BUN 12 11/20/2024    CO2 34 (H) 11/20/2024    TSH 1.20 03/15/2024    INR 1.3 (H) 03/31/2023       Scribed for Dr. Redd Wooten by Chris Rhodes.  By signing my name below, I, Silva Chadwick attest that this documentation has been prepared under the direction and in the presence of Redd Wooten MD. All medical record entries made by the Scribe were at my direction or personally dictated by me. I have reviewed the chart and agree that the record accurately reflects my personal performance of the history, physical exam, discussion and plan.

## 2025-04-14 LAB
ALBUMIN SERPL-MCNC: 3.7 G/DL (ref 3.6–5.1)
ALP SERPL-CCNC: 75 U/L (ref 35–144)
ALT SERPL-CCNC: 17 U/L (ref 9–46)
ANION GAP SERPL CALCULATED.4IONS-SCNC: 8 MMOL/L (CALC) (ref 7–17)
AST SERPL-CCNC: 14 U/L (ref 10–35)
BILIRUB SERPL-MCNC: 0.3 MG/DL (ref 0.2–1.2)
BUN SERPL-MCNC: 13 MG/DL (ref 7–25)
CALCIUM SERPL-MCNC: 9.1 MG/DL (ref 8.6–10.3)
CHLORIDE SERPL-SCNC: 99 MMOL/L (ref 98–110)
CHOLEST SERPL-MCNC: 175 MG/DL
CHOLEST/HDLC SERPL: 4.1 (CALC)
CO2 SERPL-SCNC: 33 MMOL/L (ref 20–32)
CREAT SERPL-MCNC: 0.87 MG/DL (ref 0.7–1.3)
EGFRCR SERPLBLD CKD-EPI 2021: 103 ML/MIN/1.73M2
GLUCOSE SERPL-MCNC: 149 MG/DL (ref 65–99)
HDLC SERPL-MCNC: 43 MG/DL
LDLC SERPL CALC-MCNC: 106 MG/DL (CALC)
NONHDLC SERPL-MCNC: 132 MG/DL (CALC)
POTASSIUM SERPL-SCNC: 4.3 MMOL/L (ref 3.5–5.3)
PROT SERPL-MCNC: 7.4 G/DL (ref 6.1–8.1)
SODIUM SERPL-SCNC: 140 MMOL/L (ref 135–146)
TRIGL SERPL-MCNC: 143 MG/DL

## 2025-04-16 ENCOUNTER — APPOINTMENT (OUTPATIENT)
Dept: PRIMARY CARE | Facility: CLINIC | Age: 55
End: 2025-04-16
Payer: MEDICAID

## 2025-04-16 VITALS
HEIGHT: 67 IN | BODY MASS INDEX: 49.44 KG/M2 | OXYGEN SATURATION: 94 % | TEMPERATURE: 97.7 F | HEART RATE: 67 BPM | SYSTOLIC BLOOD PRESSURE: 132 MMHG | WEIGHT: 315 LBS | DIASTOLIC BLOOD PRESSURE: 78 MMHG

## 2025-04-16 DIAGNOSIS — L97.919 VARICOSE VEINS OF LOWER EXTREMITIES WITH ULCER AND INFLAMMATION: ICD-10-CM

## 2025-04-16 DIAGNOSIS — I87.2 VENOUS (PERIPHERAL) INSUFFICIENCY: ICD-10-CM

## 2025-04-16 DIAGNOSIS — F32.A DEPRESSION, UNSPECIFIED: ICD-10-CM

## 2025-04-16 DIAGNOSIS — R32 URINARY INCONTINENCE, UNSPECIFIED TYPE: ICD-10-CM

## 2025-04-16 DIAGNOSIS — I87.8 VENOUS STASIS: ICD-10-CM

## 2025-04-16 DIAGNOSIS — R35.0 URINARY FREQUENCY: ICD-10-CM

## 2025-04-16 DIAGNOSIS — L97.929 VARICOSE VEINS OF LOWER EXTREMITIES WITH ULCER AND INFLAMMATION: ICD-10-CM

## 2025-04-16 DIAGNOSIS — I87.2 VENOUS STASIS DERMATITIS OF BOTH LOWER EXTREMITIES: ICD-10-CM

## 2025-04-16 DIAGNOSIS — E11.9 TYPE 2 DIABETES MELLITUS WITHOUT COMPLICATION, WITHOUT LONG-TERM CURRENT USE OF INSULIN: ICD-10-CM

## 2025-04-16 DIAGNOSIS — F79 INTELLECTUAL DISABILITY: ICD-10-CM

## 2025-04-16 DIAGNOSIS — I83.219 VARICOSE VEINS OF LOWER EXTREMITIES WITH ULCER AND INFLAMMATION: ICD-10-CM

## 2025-04-16 DIAGNOSIS — E66.01 MORBID OBESITY (MULTI): ICD-10-CM

## 2025-04-16 DIAGNOSIS — N39.44 NOCTURNAL ENURESIS: ICD-10-CM

## 2025-04-16 DIAGNOSIS — I10 ESSENTIAL HYPERTENSION, BENIGN: Primary | ICD-10-CM

## 2025-04-16 DIAGNOSIS — I83.229 VARICOSE VEINS OF LOWER EXTREMITIES WITH ULCER AND INFLAMMATION: ICD-10-CM

## 2025-04-16 DIAGNOSIS — E78.2 MIXED HYPERLIPIDEMIA: ICD-10-CM

## 2025-04-16 DIAGNOSIS — R73.9 HYPERGLYCEMIA: ICD-10-CM

## 2025-04-16 LAB — POC HEMOGLOBIN A1C: 7.1 % (ref 4.2–6.5)

## 2025-04-16 PROCEDURE — 3008F BODY MASS INDEX DOCD: CPT | Performed by: FAMILY MEDICINE

## 2025-04-16 PROCEDURE — 83036 HEMOGLOBIN GLYCOSYLATED A1C: CPT | Performed by: FAMILY MEDICINE

## 2025-04-16 PROCEDURE — 3051F HG A1C>EQUAL 7.0%<8.0%: CPT | Performed by: FAMILY MEDICINE

## 2025-04-16 PROCEDURE — 1036F TOBACCO NON-USER: CPT | Performed by: FAMILY MEDICINE

## 2025-04-16 PROCEDURE — 99214 OFFICE O/P EST MOD 30 MIN: CPT | Performed by: FAMILY MEDICINE

## 2025-04-16 PROCEDURE — 3078F DIAST BP <80 MM HG: CPT | Performed by: FAMILY MEDICINE

## 2025-04-16 PROCEDURE — 3075F SYST BP GE 130 - 139MM HG: CPT | Performed by: FAMILY MEDICINE

## 2025-04-16 RX ORDER — IBUPROFEN 200 MG
1 CAPSULE ORAL DAILY
Qty: 100 STRIP | Refills: 1 | Status: SHIPPED | OUTPATIENT
Start: 2025-04-16

## 2025-04-16 RX ORDER — METFORMIN HYDROCHLORIDE 500 MG/1
500 TABLET ORAL
Qty: 180 TABLET | Refills: 1 | Status: SHIPPED | OUTPATIENT
Start: 2025-04-16 | End: 2026-05-21

## 2025-04-16 RX ORDER — BUPROPION HYDROCHLORIDE 150 MG/1
150 TABLET, EXTENDED RELEASE ORAL 2 TIMES DAILY
Qty: 180 TABLET | Refills: 3 | Status: SHIPPED | OUTPATIENT
Start: 2025-04-16 | End: 2026-04-16

## 2025-04-16 RX ORDER — LANCETS
EACH MISCELLANEOUS
Qty: 100 EACH | Refills: 3 | Status: SHIPPED | OUTPATIENT
Start: 2025-04-16

## 2025-04-16 RX ORDER — INSULIN PUMP SYRINGE, 3 ML
EACH MISCELLANEOUS
Qty: 1 EACH | Refills: 1 | Status: SHIPPED | OUTPATIENT
Start: 2025-04-16 | End: 2026-04-16

## 2025-04-16 NOTE — PROGRESS NOTES
"Subjective   Chief complaint: Colt Lewis is a 54 y.o. male who presents for Annual Exam and Results (Lab review).    HPI:  HPI  Chronic disease management.  Went to see urology.  Notes reviewed.  Added on Myrbetriq.  Continue Cardura.  Follow-up.  Went to the see the wound center.  Several visits.  Graduated home health.  Ongoing chronic management.  Leg wraps.  Reviewed labs.  A1c is abnormal.  New onset diabetes.  Will add metformin.  Discussed obesity.  Diabetic education referral.  Glucometer ordered test fasting daily.  Otherwise reinforced lifestyle modifications continue current medical therapy.  Follow-up in 4 to 6 weeks.  Objective   /78 (BP Location: Right arm, Patient Position: Sitting)   Pulse 67   Temp 36.5 °C (97.7 °F) (Temporal)   Ht 1.702 m (5' 7\")   Wt (!) 158 kg (349 lb 6.4 oz)   SpO2 94% Comment: RA  BMI 54.72 kg/m²   Physical Exam  Vitals and nursing note reviewed.   Constitutional:       Appearance: Normal appearance.   HENT:      Head: Normocephalic and atraumatic.   Eyes:      Extraocular Movements: Extraocular movements intact.      Conjunctiva/sclera: Conjunctivae normal.      Pupils: Pupils are equal, round, and reactive to light.   Cardiovascular:      Rate and Rhythm: Normal rate and regular rhythm.      Heart sounds: Normal heart sounds.   Pulmonary:      Effort: Pulmonary effort is normal.      Breath sounds: Normal breath sounds.   Abdominal:      General: Abdomen is flat. Bowel sounds are normal.      Palpations: Abdomen is soft.   Musculoskeletal:         General: Normal range of motion.   Skin:     General: Skin is warm and dry.   Neurological:      General: No focal deficit present.      Mental Status: He is alert and oriented to person, place, and time. Mental status is at baseline.   Psychiatric:         Mood and Affect: Mood normal.         Behavior: Behavior normal.       Review of Systems   I have reviewed and reconciled the medication list with the patient " today. Current Medications[1]     Imaging:  Imaging  No results found.    Cardiology, Vascular, and Other Imaging  No other imaging results found for the past 7 days       Labs reviewed:    Lab Results   Component Value Date    WBC 6.9 03/17/2024    HGB 12.6 (L) 03/17/2024    HCT 37.9 (L) 03/17/2024     03/17/2024    CHOL 175 04/14/2025    TRIG 143 04/14/2025    HDL 43 04/14/2025    ALT 17 04/14/2025    AST 14 04/14/2025     04/14/2025    K 4.3 04/14/2025    CL 99 04/14/2025    CREATININE 0.87 04/14/2025    BUN 13 04/14/2025    CO2 33 (H) 04/14/2025    TSH 1.20 03/15/2024    PSA 0.15 03/04/2024    INR 1.3 (H) 03/31/2023    HGBA1C 7.1 (A) 04/16/2025       Assessment/Plan   Problem List Items Addressed This Visit           ICD-10-CM    Essential hypertension, benign - Primary I10    Intellectual disability F79    Mixed hyperlipidemia E78.2    Morbid obesity (Multi) E66.01    Varicose veins of lower extremities with ulcer and inflammation I83.229, I83.219, L97.919, L97.929    Venous (peripheral) insufficiency I87.2    Venous stasis I87.8     Other Visit Diagnoses         Codes      Depression, unspecified     F32.A    Relevant Medications    buPROPion SR (Wellbutrin SR) 150 mg 12 hr tablet      Urinary incontinence, unspecified type     R32      Nocturnal enuresis     N39.44      Venous stasis dermatitis of both lower extremities     I87.2      Urinary frequency     R35.0      Hyperglycemia     R73.9    Relevant Orders    POCT glycosylated hemoglobin (Hb A1C) manually resulted (Completed)      Type 2 diabetes mellitus without complication, without long-term current use of insulin     E11.9    Relevant Medications    metFORMIN (Glucophage) 500 mg tablet    Other Relevant Orders    Referral to Diabetes Education    Comprehensive metabolic panel    Lipid panel    Hemoglobin A1c    Albumin-Creatinine Ratio, Urine Random            Reinforced lifestyle modifications  Continue current medications as  listed  Physical activity as tolerated and healthy diet encouraged  Maintain a healthy weight  Follow up in 3mo            [1]   Current Outpatient Medications:     amLODIPine (Norvasc) 10 mg tablet, Take 0.5 tablets (5 mg) by mouth once daily., Disp: , Rfl:     calcium carb/mag oxide/Cu/zinc (calcium-magnesium-copper-zinc) tablet, Take 1 tablet by mouth once daily., Disp: , Rfl:     cholecalciferol (Vitamin D-3) 25 MCG (1000 UT) tablet, Take 1 tablet (25 mcg) by mouth once daily., Disp: , Rfl:     cloNIDine (Catapres-TTS) 0.3 mg/24 hr patch, Apply one patach on the skin and replace every 7 days, as directed, Disp: 8 patch, Rfl: 1    doxazosin (Cardura) 2 mg tablet, TAKE 1 TABLET BY MOUTH TWICE DAILY, Disp: 180 tablet, Rfl: 3    escitalopram (Lexapro) 5 mg tablet, TAKE 1 TABLET BY MOUTH ONCE DAILY, Disp: 90 tablet, Rfl: 1    metoprolol succinate XL (Toprol-XL) 100 mg 24 hr tablet, Take 1 tablet (100 mg) by mouth once daily., Disp: 90 tablet, Rfl: 1    mirabegron (Myrbetriq) 25 mg tablet extended release 24 hr, Take 1 tablet (25 mg) by mouth once daily., Disp: 30 tablet, Rfl: 3    miscellaneous medical supply (Blood Pressure Cuff) misc, 1 Device once daily., Disp: 1 each, Rfl: 0    naltrexone (Depade) 50 mg tablet, Take 1 tablet (50 mg) by mouth once daily., Disp: 90 tablet, Rfl: 1    omeprazole (PriLOSEC) 20 mg DR capsule, Take 1 capsule by mouth once daily., Disp: 90 capsule, Rfl: 1    potassium chloride CR 10 mEq ER tablet, Take 1 tablet (10 mEq) by mouth 2 times a day., Disp: 180 tablet, Rfl: 1    valsartan-hydrochlorothiazide (Diovan-HCT) 320-25 mg tablet, Take 1 tablet by mouth once daily., Disp: 90 tablet, Rfl: 1    buPROPion SR (Wellbutrin SR) 150 mg 12 hr tablet, Take 1 tablet (150 mg) by mouth 2 times a day., Disp: 180 tablet, Rfl: 3    metFORMIN (Glucophage) 500 mg tablet, Take 1 tablet (500 mg) by mouth 2 times daily (morning and late afternoon)., Disp: 180 tablet, Rfl: 1

## 2025-05-05 DIAGNOSIS — E11.9 TYPE 2 DIABETES MELLITUS WITHOUT COMPLICATION, WITHOUT LONG-TERM CURRENT USE OF INSULIN: ICD-10-CM

## 2025-05-05 DIAGNOSIS — K21.9 GASTRO-ESOPHAGEAL REFLUX DISEASE WITHOUT ESOPHAGITIS: ICD-10-CM

## 2025-05-05 RX ORDER — IBUPROFEN 200 MG
1 CAPSULE ORAL DAILY
Qty: 100 STRIP | Refills: 1 | Status: SHIPPED | OUTPATIENT
Start: 2025-05-05

## 2025-05-05 RX ORDER — OMEPRAZOLE 20 MG/1
20 CAPSULE, DELAYED RELEASE ORAL DAILY
Qty: 90 CAPSULE | Refills: 1 | Status: SHIPPED | OUTPATIENT
Start: 2025-05-05

## 2025-05-05 NOTE — TELEPHONE ENCOUNTER
Patient called the office today requesting medication refill. Rx pended    Last OV:    Pending OV:    I advised patient of an rx being sent over on 04/16/2025 and that 100 strips were sent over. I confirmed with patient 100 strips and the frequency of testing. Patient confirmed and states that he is all out

## 2025-05-05 NOTE — TELEPHONE ENCOUNTER
Pharmacy requesting refills   4/16/2025  Future Appointments   Date Time Provider Department Center   5/13/2025 11:00 AM Claire Drew RN [id number] [facility]   5/21/2025 11:15 AM Rosalio Medel MD [id number] [facility]   6/16/2025 12:30 PM Sudeep Sanchez MD [id number] [facility]   8/6/2025 11:15 AM Redd Wooten MD [id number] [facility]

## 2025-05-13 ENCOUNTER — NUTRITION (OUTPATIENT)
Dept: NUTRITION | Facility: CLINIC | Age: 55
End: 2025-05-13
Payer: MEDICAID

## 2025-05-13 DIAGNOSIS — E11.9 TYPE 2 DIABETES MELLITUS WITHOUT COMPLICATION, WITHOUT LONG-TERM CURRENT USE OF INSULIN: ICD-10-CM

## 2025-05-13 PROCEDURE — G0108 DIAB MANAGE TRN  PER INDIV: HCPCS | Performed by: EMERGENCY MEDICINE

## 2025-05-13 NOTE — PROGRESS NOTES
Reason for Visit:  Colt Lewis is a 54 y.o. male who presents for Initial DSME Visit    DSME - Global Assessment    Marital Status: single.  Support Person: cousin and brother Stefano who he lives with.    What do you hope to gain from this diabetes education visit? What I can eat    Have you had diabetes education in the past?  No.  In Your words, what is Diabetes: something to do with sugar.   What Concerns you most about having diabetes: no    Readiness to Learn: demonstrates willingness to learn and demonstrates ability to learn  Preferred learning method: reading, video    How often do you need to have someone help you when you read instructions, pamphlets or other written material from your doctor or pharmacy?   Sometimes    Household Composition: living independently, with family    Demographics:   Difficulties with: physical mobility, has intellectual disability  Highest Level of Education: High School  Race/Ethnic Origin: White/  Occupation:  disabled  Work hours:     Health Status:  Smoking/Tobacco Use: No, patient does not smoke or use tobacco.  Alcohol Use: No, patient does not drink alcohol.    Type of Diabetes: Type 2  What year were you diagnosed with diabetes: recent  Family History: uncle    Patient Active Problem List    Diagnosis Date Noted    Hypokalemia 09/03/2024    Resistant hypertension 06/17/2024    Asymptomatic hypertensive urgency 03/15/2024    Hypertensive urgency 03/15/2024    Acute cholecystitis 12/13/2023    Mixed hyperlipidemia 12/13/2023    Venous (peripheral) insufficiency 12/13/2023    Elevated glucose 05/10/2018    Intellectual disability 05/10/2018    Varicose veins of lower extremities with ulcer and inflammation 01/25/2013    Venous stasis 04/20/2010    Gastroesophageal reflux disease 02/11/2009    Morbid obesity (Multi) 02/11/2009    Essential hypertension, benign 11/06/2007      Lab Results   Component Value Date    HGBA1C 7.1 (A) 04/16/2025       Health  Utilization Past 12 Months:   Hospital Admissions: No.  ER Visits: No.  Primary Care Visits: Yes.  Last Eye Exam : not sure. Thinks last time he did was 3 yr ago. Discussed going once per year.   Podiatry : routinely one comes to house to trim toenails.   Dentist : does not go. Cousin said maybe 4 years ago. Patient doesn't have top teeth. Does not wear top dentures because they don't fit well. Has his own teeth on bottom. Cousin says she will help him get dental visit it.     Diabetes Self-Management Skills and Behaviors:   Do you exercise regularly?: No. Trying to walk more, but is not consistent with getting in. Admits he has to be nudged to be more physical activity.     Yes  How do you manage your diabetes when you are sick?: unsure    Diabetes Medications: oral agents  Current Outpatient Medications   Medication Instructions    amLODIPine (NORVASC) 5 mg, oral, Daily    Blood glucose monitoring meter Test daily fasting in am    blood sugar diagnostic (Blood Glucose Test) 1 strip, miscellaneous, Daily    buPROPion SR (WELLBUTRIN SR) 150 mg, oral, 2 times daily    calcium carb/mag oxide/Cu/zinc (calcium-magnesium-copper-zinc) tablet 1 tablet, Daily RT    cholecalciferol (Vitamin D-3) 25 MCG (1000 UT) tablet 1 tablet, Daily    cloNIDine (Catapres-TTS) 0.3 mg/24 hr patch Apply one patach on the skin and replace every 7 days, as directed    doxazosin (CARDURA) 2 mg, oral, 2 times daily    escitalopram (LEXAPRO) 5 mg, oral, Daily    lancets misc Test daily in the am fasting    metFORMIN (GLUCOPHAGE) 500 mg, oral, 2 times daily (morning and late afternoon)    metoprolol succinate XL (TOPROL-XL) 100 mg, oral, Daily    mirabegron (MYRBETRIQ) 25 mg, oral, Daily    miscellaneous medical supply (Blood Pressure Cuff) misc 1 Device, miscellaneous, Daily    naltrexone (DEPADE) 50 mg, oral, Daily    omeprazole (PRILOSEC) 20 mg, oral, Daily    potassium chloride CR 10 mEq ER tablet 10 mEq, oral, 2 times daily     valsartan-hydrochlorothiazide (Diovan-HCT) 320-25 mg tablet 1 tablet, oral, Daily          DM medications as patient is taking them:    Visiting health nurse three times a week. She sets up his pill box.     Per cousin, pt brother Stefano reminds him at times to take pills.     Monitorng: blood glucose meter: twice a day. Morning before breakfast and 8 pm.     Acute Complications-Safety: none    Hypoglycemia: None  Hypoglycemia Treatment: none    Hyperglycemia: None  Hyperglycemia Treatment: none      Diabetes Assessment:   DM Interferes with other aspects of my life: neutral.  My level of stress is high: neutral.  I struggle with making changes in my life: neutral.  How do you handle stress: watch tv  Most difficult part of managing DM: eat less sweet stuff.    DSME - Meal Planning and Diet Recall  Are you currently following any meal plan: No Plan. Has no top teeth, but doesn't eat nuts due to that and celery.     Self-Identified challenges:     Does your culture or Confucianist require any of the following: none  Who does the grocery shopping? brother  Who does the cooking in the home? brother.     How often do you eat out? Few times a month.   How many meals do you eat in per day: three.  Which meals do you tend to skip: none  What do you drink with and between meals: 2 cups coffee with creamer sugar free powder, diet dr. [place], milk, tea powder sugar free      DSME - Goals and Recommendations    [facility] Diabetes Education Program SMART Behavior Goal Setting:        S - Specific: Exactly what do you want to do        M - Measurable: Use a calendar or chart to track progress        A - Attainable: Take small steps to make bigger changes        R - Realistic: Pick something reasonable that you know you can do        T - Time Oriented: Choose a time limit (No longer than 6 months)    Specific Goal:   I will decide on when and be consistent with when to check blood sugar in the evening.    Currently seems like  he could be checking too soon after dinner.     When checking blood sugar I will either wash my hands first or if using alcohol swab, I will wipe away the first drop and squeeze for second sample.     Measurable: How will I track my goal:  I will keep track of my progress daily by chart.    Time Oriented: four weeks.    Topics Covered and Impression:    DSME Topics Covered During Visit:   Using a Blood Sugar Monitor  Using BG Logbook  Review Glycemic Goals (CGM or SMBG)  Healthy Meal Plan  Macro nutrients  Including fiber, protein and healthy fat at meals & snacks  Ways to include treats less often and eat at end of a meal rather than as snack    DSME Topics for Follow-Up:   Understanding Diabetes Basics  Being Physically Active  Using a Blood Sugar Monitor  Using BG Logbook  Review Glycemic Goals (CGM or SMBG)  Reviewed Hypoglycemia Signs/Symptoms/Tx Plan  Reviewed Hyperglycemia Signs/Symptoms/Tx Plan    Materials provided during today's visit:  DM patient guide, 2 handouts on exercise, blood glucose log sheet, A1C handout with patient's level written along with target level, hypo/hyperglycemia signs/symptoms & treatment for hypoglycemia. [employer] healthy meal planning booklet. Typed up goals and some key points.     Provider Impression: Patient here with cousin who in addition to his brother supports him in his self care and drives them to appointments. He has newly diagnosed T2DM.   Per his log book fasting 104-130 and 730 pm range 107-164, but most in 110's. Unsure how long after dinner 730 pm is. We discussed a few times that difference in time between before dinner, 2hr after and before bedtime. Encouraged pt and brother to what is easiest for them. Sounds like he may be checking evening time too soon after dinner. Patient's cousin took some notes as well and will discuss with patient's brother. Patient admits they like a routine.   Encouraged pt to eat less sweets. He will eat cookies and milk about 3  times a week. Cousin said that pt brother is looking up food labels more and has made a few lower sugar desserts here and there. She asked about artificial sugars. Answered her questions.   Recommend they meet with RD for more in-depth education on diet and to help with weight management. They agree. Will message PCP.   The referring provider is within the same EMR and is able to see the DSMES provided and the outcomes.       Time: I personally spent a total of 60 minutes with the patient providing diabetes self-management education. Visit documentation will be sent electronically to referring provider.

## 2025-05-21 ENCOUNTER — APPOINTMENT (OUTPATIENT)
Dept: PRIMARY CARE | Facility: CLINIC | Age: 55
End: 2025-05-21
Payer: MEDICAID

## 2025-06-16 ENCOUNTER — APPOINTMENT (OUTPATIENT)
Dept: CARDIOLOGY | Facility: CLINIC | Age: 55
End: 2025-06-16
Payer: MEDICAID

## 2025-06-16 VITALS
BODY MASS INDEX: 49.44 KG/M2 | HEART RATE: 73 BPM | SYSTOLIC BLOOD PRESSURE: 122 MMHG | WEIGHT: 315 LBS | DIASTOLIC BLOOD PRESSURE: 78 MMHG | HEIGHT: 67 IN

## 2025-06-16 DIAGNOSIS — I1A.0 RESISTANT HYPERTENSION: ICD-10-CM

## 2025-06-16 DIAGNOSIS — E78.2 MIXED HYPERLIPIDEMIA: ICD-10-CM

## 2025-06-16 DIAGNOSIS — I87.2 VENOUS (PERIPHERAL) INSUFFICIENCY: ICD-10-CM

## 2025-06-16 DIAGNOSIS — I16.0 ASYMPTOMATIC HYPERTENSIVE URGENCY: ICD-10-CM

## 2025-06-16 PROCEDURE — 3078F DIAST BP <80 MM HG: CPT | Performed by: INTERNAL MEDICINE

## 2025-06-16 PROCEDURE — 99213 OFFICE O/P EST LOW 20 MIN: CPT | Performed by: INTERNAL MEDICINE

## 2025-06-16 PROCEDURE — 1036F TOBACCO NON-USER: CPT | Performed by: INTERNAL MEDICINE

## 2025-06-16 PROCEDURE — 3008F BODY MASS INDEX DOCD: CPT | Performed by: INTERNAL MEDICINE

## 2025-06-16 PROCEDURE — 3074F SYST BP LT 130 MM HG: CPT | Performed by: INTERNAL MEDICINE

## 2025-06-16 NOTE — PROGRESS NOTES
Patient:  Colt Lewis  YOB: 1970  MRN: [id number]       HPI:       Colt Lewis is a 54 y.o. male who returns today for cardiac follow-up.  He was  initially seen for resistant hypertension. He has been noted to be intellectually challenged.  He does not have any history of atherosclerotic heart or valvular heart disease.  He has a history of hypertension dating back more than 20 years.  He has been treated primarily at [facility].  Most recently he has been maintained on amlodipine, Catapres TTS patch, Toprol-XL, and valsartan HCT.  He has a history of hyperlipidemia.  No history of diabetes mellitus.  He does not smoke.  He has a history of morbid obesity ,chronic venous insufficiency, and chronic edema.  He has a home health nurse who comes out 3 days a week to wrap his legs.  He notes that several family members developed hypertension at young ages.  He does not specifically recall having had any evaluation for secondary hypertension.  He has been hospitalized on a few occasions with hypertensive urgency.  He apparently has not very compliant with his medications or with diet.  He admittedly is very sedentary.  He ambulates with use of a walker.  At the time of his visit he was started on doxazosin 1 mg daily.  A renal arterial ultrasound did not show any significant stenosis.    He was hospitalized at [facility] March 15, 2024 through March 17, 2024 for asymptomatic hypertensive urgency.  He was initially treated in the ICU and placed on a nicardipine drip.  Doxazosin was increased to 2 mg twice daily.       He continues to do well since his last visit.  His blood pressures remain well-controlled.  He denies any chest pain or shortness of breath.  He denies any orthopnea or PND.  He denies any palpitations, lightheadedness, near-syncope, or syncope.  He denies any fever, chills, or cough.  He denies any nausea, vomiting, or diaphoresis.  He denies any hemoptysis, hematemesis,  melena, or hematochezia.  EKG showed normal sinus rhythm with nonspecific ST-T wave changes.  Lab studies March 4, 2024 were negative for elevated catecholamines or metanephrines.  Aldosterone and renin activity levels were normal.  CBC and CMP were normal.  TSH was 1.20.  An echocardiogram May 1, 2023 showed an estimated LV ejection fraction 60 to 65%.  Trivial tricuspid regurgitation. He is blood pressures are well controlled.  He is free of any anginal or CHF symptoms.  Lab studies April 16, 2025 showed a normal comprehensive metabolic profile.  Cholesterol 175 HDL 42, triglycerides 143, and LDL 1 6.  Hemoglobin A1c 7.1.  Continue amlodipine, Catapres, doxazosin, valsartan HCT, and metformin.  Other details as noted below.        Objective:     Vitals:    06/16/25 1224   BP: 122/78   Pulse: 73       Wt Readings from Last 4 Encounters:   04/16/25 (!) 158 kg (349 lb 6.4 oz)   04/09/25 (!) 156 kg (345 lb)   03/05/25 (!) 158 kg (349 lb 3.2 oz)   01/31/25 (!) 157 kg (346 lb)       Allergies:     Allergies   Allergen Reactions    Penicillins Hives    Sulfamethoxazole-Trimethoprim Rash          Medications:     Current Outpatient Medications   Medication Instructions    amLODIPine (NORVASC) 5 mg, oral, Daily    Blood glucose monitoring meter Test daily fasting in am    blood sugar diagnostic (Blood Glucose Test) 1 strip, miscellaneous, Daily    buPROPion SR (WELLBUTRIN SR) 150 mg, oral, 2 times daily    calcium carb/mag oxide/Cu/zinc (calcium-magnesium-copper-zinc) tablet 1 tablet, Daily RT    cholecalciferol (Vitamin D-3) 25 MCG (1000 UT) tablet 1 tablet, Daily    cloNIDine (Catapres-TTS) 0.3 mg/24 hr patch Apply one patach on the skin and replace every 7 days, as directed    doxazosin (CARDURA) 2 mg, oral, 2 times daily    escitalopram (LEXAPRO) 5 mg, oral, Daily    lancets misc Test daily in the am fasting    metFORMIN (GLUCOPHAGE) 500 mg, oral, 2 times daily (morning and late afternoon)    metoprolol succinate XL  (TOPROL-XL) 100 mg, oral, Daily    mirabegron (MYRBETRIQ) 25 mg, oral, Daily    miscellaneous medical supply (Blood Pressure Cuff) misc 1 Device, miscellaneous, Daily    naltrexone (DEPADE) 50 mg, oral, Daily    omeprazole (PRILOSEC) 20 mg, oral, Daily    potassium chloride CR 10 mEq ER tablet 10 mEq, oral, 2 times daily    valsartan-hydrochlorothiazide (Diovan-HCT) 320-25 mg tablet 1 tablet, oral, Daily       Physical Examination:   GENERAL:  Well developed, well nourished, in no acute distress.  CHEST:  Symmetric and nontender.  NEURO/PSYCH:  Alert and oriented times three with approppriate behavior and responses.  NECK:  Supple, no JVD, no bruit.  LUNGS:  Clear to auscultation bilaterally, normal respiratory effort.  HEART:  Rate and rhythm regular with no evident murmur, no gallop appreciated.        There are no rubs, clicks or heaves.  EXTREMITIES: Bilateral leg wraps in place.  PERIPHERAL VASCULAR:  Pulses present and equally palpable; 2+ throughout.      Lab:     CBC:   Lab Results   Component Value Date    WBC 6.9 03/17/2024    RBC 4.50 03/17/2024    HGB 12.6 (L) 03/17/2024    HCT 37.9 (L) 03/17/2024     03/17/2024        CMP:    Lab Results   Component Value Date     04/14/2025    K 4.3 04/14/2025    CL 99 04/14/2025    CO2 33 (H) 04/14/2025    BUN 13 04/14/2025    CREATININE 0.87 04/14/2025    GLUCOSE 149 (H) 04/14/2025    CALCIUM 9.1 04/14/2025       Lipid Profile:    Lab Results   Component Value Date    TRIG 143 04/14/2025    HDL 43 04/14/2025    LDLCALC 106 (H) 04/14/2025       BMP:  Lab Results   Component Value Date     04/14/2025     11/20/2024     (L) 03/17/2024     03/16/2024    K 4.3 04/14/2025    K 4.4 11/20/2024    K 3.8 03/17/2024    K 3.8 03/16/2024    CL 99 04/14/2025     11/20/2024     03/17/2024     03/16/2024    CO2 33 (H) 04/14/2025    CO2 34 (H) 11/20/2024    CO2 28 03/17/2024    CO2 27 03/16/2024    BUN 13 04/14/2025    BUN 12  11/20/2024    BUN 23 03/17/2024    BUN 11 03/16/2024    CREATININE 0.87 04/14/2025    CREATININE 0.87 11/20/2024    CREATININE 0.93 03/17/2024    CREATININE 0.60 03/16/2024       CBC:  Lab Results   Component Value Date    WBC 6.9 03/17/2024    WBC 9.5 03/16/2024    WBC 7.2 03/15/2024    RBC 4.50 03/17/2024    RBC 4.84 03/16/2024    RBC 5.14 03/15/2024    HGB 12.6 (L) 03/17/2024    HGB 13.4 (L) 03/16/2024    HGB 14.3 03/15/2024    HCT 37.9 (L) 03/17/2024    HCT 40.7 (L) 03/16/2024    HCT 43.0 03/15/2024    MCV 84 03/17/2024    MCV 84 03/16/2024    MCV 84 03/15/2024    MCH 28.0 03/17/2024    MCH 27.7 03/16/2024    MCH 27.8 03/15/2024    MCHC 33.2 03/17/2024    MCHC 32.9 03/16/2024    MCHC 33.3 03/15/2024    RDW 14.2 03/17/2024    RDW 14.0 03/16/2024    RDW 13.7 03/15/2024     03/17/2024     03/16/2024     03/15/2024       Hepatic Function Panel:    Lab Results   Component Value Date    ALKPHOS 75 04/14/2025    ALT 17 04/14/2025    AST 14 04/14/2025    PROT 7.4 04/14/2025    BILITOT 0.3 04/14/2025    BILIDIR 0.1 07/03/2020       HgBA1c:    Lab Results   Component Value Date    HGBA1C 7.1 (A) 04/16/2025       Magnesium:    Lab Results   Component Value Date    MG 2.08 03/17/2024       TSH:    Lab Results   Component Value Date    TSH 1.20 03/15/2024       BNP:   Lab Results   Component Value Date    BNP 66 01/12/2023        PT/INR:    Lab Results   Component Value Date    PROTIME 14.6 (H) 03/31/2023    INR 1.3 (H) 03/31/2023       Cardiac Enzymes:    Lab Results   Component Value Date    TROPHS 10 03/31/2023    TROPHS 10 03/31/2023    TROPHS 8 03/31/2023         Diagnostic Studies:     ECG 12 lead    Result Date: 3/16/2024  Normal sinus rhythm Nonspecific T wave abnormality Abnormal ECG When compared with ECG of 31-MAR-2023 16:10, Previous ECG has undetermined rhythm, needs review Non-specific change in ST segment in Anterior leads See ED provider note for full interpretation and clinical  correlation Confirmed by Zoie Prabhakar ([id number]) on 3/16/2024 12:09:36 PM    XR chest 1 view    Result Date: 3/15/2024  Interpreted By:  Peng Davis, STUDY: XR CHEST 1 VIEW;  3/15/2024 3:49 pm   INDICATION: Signs/Symptoms:Elevated blood pressure.   COMPARISON: 03/31/2023.   ACCESSION NUMBER(S): [id number]   ORDERING CLINICIAN: MARII PALAFOX   FINDINGS:   The cardiac silhouette is unremarkable. Costophrenic angles are sharp. Lungs are clear. The trachea is midline. There is no pneumothorax. No acute osseous abnormality is seen.       1.  No active cardiopulmonary process.     Signed by: Peng Davis 3/15/2024 3:58 PM Dictation workstation:   [id number]      Problem List:     Patient Active Problem List   Diagnosis    Acute cholecystitis    Elevated glucose    Essential hypertension, benign    Gastroesophageal reflux disease    Intellectual disability    Mixed hyperlipidemia    Morbid obesity (Multi)    Varicose veins of lower extremities with ulcer and inflammation    Venous (peripheral) insufficiency    Venous stasis    Asymptomatic hypertensive urgency    Hypertensive urgency    Resistant hypertension    Hypokalemia       Asessment:     Problem List Items Addressed This Visit           ICD-10-CM    Mixed hyperlipidemia E78.2    Relevant Orders    Follow Up In Cardiology    Venous (peripheral) insufficiency I87.2    Relevant Orders    Follow Up In Cardiology    Asymptomatic hypertensive urgency I16.0    Relevant Orders    Follow Up In Cardiology    Resistant hypertension I1A.0    Relevant Orders    Follow Up In Cardiology     Scribe Attestation  By signing my name below, I, Kristi Correia RN, Scribe   attest that this documentation has been prepared under the direction and in the presence of Sudeep Sanchez MD.    Provider Attestation - Scribe documentation    The above portion of this note was dictated by me using voice recognition software.  All medical record entries made by the scribe were at my  direction.  The scribe entering the documentation was in my presence.   I have reviewed the chart and agree that the record accurately reflects my personal performance of the history, physical exam, discussion and plan.

## 2025-06-16 NOTE — PATIENT INSTRUCTIONS
Follow up with Dr. Sudeep Sanchez MD, [facility] in 1 year     DID YOU KNOW  We have a pharmacy here in the [facility].  They can fill all prescriptions, not just cardiac medications.  Prescriptions from other pharmacies can easily be transferred to the  pharmacy by the  pharmacist on site.   pharmacies offer FREE HOME DELIVERY on medications to anywhere in [place]. They can sync your medications. Typically prescriptions can be ready in 10 - 15 minutes. If pharmacy is unable to fill your  prescription or if cost is more than your paying now the Pharmacist can easily transfer back to your Pharmacy of choice. Pharmacy phone # [phone].     Please bring all medicines, vitamins, and herbal supplements with you in original bottles to every appointment! This is the best way to ensure your medication list in your chart is accurate.    Prescriptions will not be filled unless you are compliant with your follow up appointments or have a follow up appointment scheduled as per instruction of your physician. Refills should be requested at the time of your visit.

## 2025-06-17 DIAGNOSIS — Z00.00 HEALTHCARE MAINTENANCE: ICD-10-CM

## 2025-06-19 RX ORDER — NALTREXONE HYDROCHLORIDE 50 MG/1
50 TABLET, FILM COATED ORAL DAILY
Qty: 90 TABLET | Refills: 1 | Status: SHIPPED | OUTPATIENT
Start: 2025-06-19

## 2025-06-19 NOTE — TELEPHONE ENCOUNTER
Pharmacy RX Request    Last OV: 04/16/2025    Pending OV: 06/25/2025     87F with hx of MI 3/14/20 treated with 3 stents to LAD with low EF (had life vest, declined AICD), OM1 stent 5/2020, chronic LE edema, and afib who presented with dizziness and paleness earlier tonight.  Patient was in her usual state of health with no recent illness when tonight she noted to be her BP to be relatively higher than normal (/80s when usually is around 114/60s).  Patient then started to feel unwell.   as per patient, woke up in the monring, felt dizzy, room spinning around,, vertigo and hypotensive, was sent to hospital, for r/o cva, no weakness or numbness noted, suspect from afib c rvr  will start on cardizem drip, to maintain goal of

## 2025-06-24 ENCOUNTER — TELEPHONE (OUTPATIENT)
Dept: PRIMARY CARE | Facility: CLINIC | Age: 55
End: 2025-06-24
Payer: MEDICAID

## 2025-06-24 NOTE — TELEPHONE ENCOUNTER
Rec'd call from Cherie ASENCIO. Discharged from Wound Care with boots. ELIF states he now has open ulcers needing new order for wound care and supplies. Fax [phone]. Supplies needed Calcium alginate with Silver, Coban toes to knees, Ace Wraps, 4x4 and Curlex. Dressing changes are Monday- Wednesday- Friday. He is open on ble with serous discharge. Warmth and inflamed. Patient has pending OV 6/25/25. Tasked to provider.

## 2025-06-25 ENCOUNTER — APPOINTMENT (OUTPATIENT)
Dept: PRIMARY CARE | Facility: CLINIC | Age: 55
End: 2025-06-25
Payer: MEDICAID

## 2025-06-25 ENCOUNTER — HOSPITAL ENCOUNTER (OUTPATIENT)
Dept: RADIOLOGY | Facility: HOSPITAL | Age: 55
Discharge: HOME | End: 2025-06-25
Payer: MEDICAID

## 2025-06-25 VITALS
HEIGHT: 67 IN | TEMPERATURE: 98.2 F | OXYGEN SATURATION: 96 % | HEART RATE: 77 BPM | BODY MASS INDEX: 49.44 KG/M2 | SYSTOLIC BLOOD PRESSURE: 108 MMHG | DIASTOLIC BLOOD PRESSURE: 72 MMHG | WEIGHT: 315 LBS

## 2025-06-25 DIAGNOSIS — L97.929 VARICOSE VEINS OF LOWER EXTREMITIES WITH ULCER AND INFLAMMATION: ICD-10-CM

## 2025-06-25 DIAGNOSIS — I83.219 VARICOSE VEINS OF LOWER EXTREMITIES WITH ULCER AND INFLAMMATION: ICD-10-CM

## 2025-06-25 DIAGNOSIS — I83.229 VARICOSE VEINS OF LOWER EXTREMITIES WITH ULCER AND INFLAMMATION: ICD-10-CM

## 2025-06-25 DIAGNOSIS — E11.9 TYPE 2 DIABETES MELLITUS WITHOUT COMPLICATION, WITHOUT LONG-TERM CURRENT USE OF INSULIN: ICD-10-CM

## 2025-06-25 DIAGNOSIS — R63.5 WEIGHT GAIN: ICD-10-CM

## 2025-06-25 DIAGNOSIS — L97.919 VARICOSE VEINS OF LOWER EXTREMITIES WITH ULCER AND INFLAMMATION: ICD-10-CM

## 2025-06-25 DIAGNOSIS — I87.2 VENOUS (PERIPHERAL) INSUFFICIENCY: ICD-10-CM

## 2025-06-25 DIAGNOSIS — R06.09 DOE (DYSPNEA ON EXERTION): ICD-10-CM

## 2025-06-25 DIAGNOSIS — I87.8 VENOUS STASIS: Primary | ICD-10-CM

## 2025-06-25 DIAGNOSIS — L03.119 CELLULITIS OF LOWER EXTREMITY, UNSPECIFIED LATERALITY: ICD-10-CM

## 2025-06-25 DIAGNOSIS — F79 INTELLECTUAL DISABILITY: ICD-10-CM

## 2025-06-25 DIAGNOSIS — E78.2 MIXED HYPERLIPIDEMIA: ICD-10-CM

## 2025-06-25 DIAGNOSIS — E66.01 MORBID OBESITY (MULTI): ICD-10-CM

## 2025-06-25 DIAGNOSIS — I87.2 VENOUS STASIS DERMATITIS OF BOTH LOWER EXTREMITIES: ICD-10-CM

## 2025-06-25 PROCEDURE — 71046 X-RAY EXAM CHEST 2 VIEWS: CPT

## 2025-06-25 PROCEDURE — 3008F BODY MASS INDEX DOCD: CPT | Performed by: FAMILY MEDICINE

## 2025-06-25 PROCEDURE — 71046 X-RAY EXAM CHEST 2 VIEWS: CPT | Performed by: RADIOLOGY

## 2025-06-25 PROCEDURE — 3078F DIAST BP <80 MM HG: CPT | Performed by: FAMILY MEDICINE

## 2025-06-25 PROCEDURE — 99214 OFFICE O/P EST MOD 30 MIN: CPT | Performed by: FAMILY MEDICINE

## 2025-06-25 PROCEDURE — 3074F SYST BP LT 130 MM HG: CPT | Performed by: FAMILY MEDICINE

## 2025-06-25 PROCEDURE — 1036F TOBACCO NON-USER: CPT | Performed by: FAMILY MEDICINE

## 2025-06-25 PROCEDURE — 3051F HG A1C>EQUAL 7.0%<8.0%: CPT | Performed by: FAMILY MEDICINE

## 2025-06-25 RX ORDER — FUROSEMIDE 20 MG/1
20 TABLET ORAL DAILY
Qty: 5 TABLET | Refills: 1 | Status: SHIPPED | OUTPATIENT
Start: 2025-06-25 | End: 2025-06-30

## 2025-06-25 RX ORDER — DOXYCYCLINE 100 MG/1
100 CAPSULE ORAL 2 TIMES DAILY
Qty: 20 CAPSULE | Refills: 0 | Status: SHIPPED | OUTPATIENT
Start: 2025-06-25 | End: 2025-07-05

## 2025-06-25 ASSESSMENT — ENCOUNTER SYMPTOMS: LEG PAIN: 1

## 2025-06-25 NOTE — PROGRESS NOTES
"Subjective   Chief complaint: Colt Lewis is a 54 y.o. male who presents for Wound Care (Referral to Wound care for BLE ulcers), Obesity (Weight gain), and Leg Pain (Redness and warmth BLE with drainage).    HPI:  Leg Pain     Chronic disease management.  Chronic venous stasis dermatitis.  Was followed by the wound center in March.  Had graduated.  However continues to have home health.  3 times weekly.  Recently patient's leg become open wounds.  Red warm swollen tender drainage.  Also has gained 20 pounds in the last 2 weeks.  He is had some dyspnea on exertion.  Cardiology follow-up couple recent was followed up was reviewed.  Blood pressures been controlled.  Echocardiogram reviewed.  Examination does not show any evidence of bibasilar rales or JVD but he does have chronic venous stasis dermatitis which is worsened and cellulitis bilateral lower extremities red warm swollen tender tender.  Will initiate a course of diuretic.  Initiate oral antibiotic.  Check laboratory assessment.  Continue wound care.  Compression.  Elevation.  Dressing changes.  Wound referral.  Continue home health.  Obtain chest x-ray.  BNP.  Other labs as ordered.  Follow-up as scheduled    Objective   /72 (BP Location: Left arm, Patient Position: Sitting)   Pulse 77   Temp 36.8 °C (98.2 °F)   Ht 1.702 m (5' 7\")   Wt (!) 160 kg (352 lb 6.4 oz)   SpO2 96% Comment: RA  BMI 55.19 kg/m²   Physical Exam  Vitals and nursing note reviewed.   Constitutional:       Appearance: Normal appearance.   HENT:      Head: Normocephalic and atraumatic.   Eyes:      Extraocular Movements: Extraocular movements intact.      Conjunctiva/sclera: Conjunctivae normal.      Pupils: Pupils are equal, round, and reactive to light.   Cardiovascular:      Rate and Rhythm: Normal rate and regular rhythm.      Heart sounds: Normal heart sounds.   Pulmonary:      Effort: Pulmonary effort is normal.      Breath sounds: Normal breath sounds.   Abdominal: "      General: Abdomen is flat. Bowel sounds are normal.      Palpations: Abdomen is soft.   Musculoskeletal:         General: Normal range of motion.   Skin:     General: Skin is warm and dry.   Neurological:      General: No focal deficit present.      Mental Status: He is alert and oriented to person, place, and time. Mental status is at baseline.   Psychiatric:         Mood and Affect: Mood normal.         Behavior: Behavior normal.       Review of Systems   I have reviewed and reconciled the medication list with the patient today. Current Medications[1]     Imaging:  Imaging  No results found.    Cardiology, Vascular, and Other Imaging  No other imaging results found for the past 7 days       Labs reviewed:    Lab Results   Component Value Date    WBC 6.9 03/17/2024    HGB 12.6 (L) 03/17/2024    HCT 37.9 (L) 03/17/2024     03/17/2024    CHOL 175 04/14/2025    TRIG 143 04/14/2025    HDL 43 04/14/2025    ALT 17 04/14/2025    AST 14 04/14/2025     04/14/2025    K 4.3 04/14/2025    CL 99 04/14/2025    CREATININE 0.87 04/14/2025    BUN 13 04/14/2025    CO2 33 (H) 04/14/2025    TSH 1.20 03/15/2024    PSA 0.15 03/04/2024    INR 1.3 (H) 03/31/2023    HGBA1C 7.1 (A) 04/16/2025       Assessment/Plan   Problem List Items Addressed This Visit           ICD-10-CM    Intellectual disability F79    Mixed hyperlipidemia E78.2    Morbid obesity (Multi) E66.01    Varicose veins of lower extremities with ulcer and inflammation I83.229, I83.219, L97.919, L97.929    Relevant Orders    B-type natriuretic peptide    Referral to Wound Clinic    Venous (peripheral) insufficiency I87.2    Relevant Orders    B-type natriuretic peptide    Referral to Wound Clinic    Venous stasis - Primary I87.8     Other Visit Diagnoses         Codes      Venous stasis dermatitis of both lower extremities     I87.2    Relevant Medications    furosemide (Lasix) 20 mg tablet    Other Relevant Orders    B-type natriuretic peptide    Referral to  Wound Clinic      Type 2 diabetes mellitus without complication, without long-term current use of insulin     E11.9      ARTIS (dyspnea on exertion)     R06.09    Relevant Medications    furosemide (Lasix) 20 mg tablet    Other Relevant Orders    CBC and Auto Differential    B-type natriuretic peptide    XR chest 2 views      Cellulitis of lower extremity, unspecified laterality     L03.119    Relevant Medications    doxycycline (Vibramycin) 100 mg capsule    Other Relevant Orders    CBC and Auto Differential    B-type natriuretic peptide    Referral to Wound Clinic      Weight gain     R63.5    Relevant Orders    XR chest 2 views            Reinforced lifestyle modifications  Continue current medications as listed  Physical activity as tolerated and healthy diet encouraged  Maintain a healthy weight  Follow up in              [1]   Current Outpatient Medications:     amLODIPine (Norvasc) 10 mg tablet, Take 0.5 tablets (5 mg) by mouth once daily., Disp: , Rfl:     Blood glucose monitoring meter, Test daily fasting in am, Disp: 1 each, Rfl: 1    blood sugar diagnostic (Blood Glucose Test), 1 strip once daily., Disp: 100 strip, Rfl: 1    buPROPion SR (Wellbutrin SR) 150 mg 12 hr tablet, Take 1 tablet (150 mg) by mouth 2 times a day., Disp: 180 tablet, Rfl: 3    calcium carb/mag oxide/Cu/zinc (calcium-magnesium-copper-zinc) tablet, Take 1 tablet by mouth once daily., Disp: , Rfl:     cholecalciferol (Vitamin D-3) 25 MCG (1000 UT) tablet, Take 1 tablet (25 mcg) by mouth once daily., Disp: , Rfl:     cloNIDine (Catapres-TTS) 0.3 mg/24 hr patch, Apply one patach on the skin and replace every 7 days, as directed, Disp: 8 patch, Rfl: 1    doxazosin (Cardura) 2 mg tablet, TAKE 1 TABLET BY MOUTH TWICE DAILY, Disp: 180 tablet, Rfl: 3    escitalopram (Lexapro) 5 mg tablet, TAKE 1 TABLET BY MOUTH ONCE DAILY, Disp: 90 tablet, Rfl: 1    lancets misc, Test daily in the am fasting, Disp: 100 each, Rfl: 3    metFORMIN (Glucophage) 500  mg tablet, Take 1 tablet (500 mg) by mouth 2 times daily (morning and late afternoon)., Disp: 180 tablet, Rfl: 1    metoprolol succinate XL (Toprol-XL) 100 mg 24 hr tablet, Take 1 tablet (100 mg) by mouth once daily., Disp: 90 tablet, Rfl: 1    mirabegron (Myrbetriq) 25 mg tablet extended release 24 hr, Take 1 tablet (25 mg) by mouth once daily., Disp: 30 tablet, Rfl: 3    miscellaneous medical supply (Blood Pressure Cuff) misc, 1 Device once daily., Disp: 1 each, Rfl: 0    naltrexone (Depade) 50 mg tablet, TAKE 1 TABLET BY MOUTH ONCE DAILY, Disp: 90 tablet, Rfl: 1    omeprazole (PriLOSEC) 20 mg DR capsule, Take 1 capsule by mouth once daily., Disp: 90 capsule, Rfl: 1    potassium chloride CR 10 mEq ER tablet, Take 1 tablet (10 mEq) by mouth 2 times a day., Disp: 180 tablet, Rfl: 1    valsartan-hydrochlorothiazide (Diovan-HCT) 320-25 mg tablet, Take 1 tablet by mouth once daily., Disp: 90 tablet, Rfl: 1    doxycycline (Vibramycin) 100 mg capsule, Take 1 capsule (100 mg) by mouth 2 times a day for 10 days. Take with at least 8 ounces (large glass) of water, do not lie down for 30 minutes after, Disp: 20 capsule, Rfl: 0    furosemide (Lasix) 20 mg tablet, Take 1 tablet (20 mg) by mouth once daily for 5 days., Disp: 5 tablet, Rfl: 1

## 2025-06-26 LAB — BNP SERPL-MCNC: 109 PG/ML

## 2025-07-02 ENCOUNTER — OFFICE VISIT (OUTPATIENT)
Dept: WOUND CARE | Facility: CLINIC | Age: 55
End: 2025-07-02
Payer: MEDICAID

## 2025-07-02 DIAGNOSIS — L03.119 CELLULITIS OF LOWER EXTREMITY, UNSPECIFIED LATERALITY: ICD-10-CM

## 2025-07-02 DIAGNOSIS — I83.229 VARICOSE VEINS OF LOWER EXTREMITIES WITH ULCER AND INFLAMMATION: ICD-10-CM

## 2025-07-02 DIAGNOSIS — L97.929 VARICOSE VEINS OF LOWER EXTREMITIES WITH ULCER AND INFLAMMATION: ICD-10-CM

## 2025-07-02 DIAGNOSIS — I87.2 VENOUS STASIS DERMATITIS OF BOTH LOWER EXTREMITIES: ICD-10-CM

## 2025-07-02 DIAGNOSIS — L97.919 VARICOSE VEINS OF LOWER EXTREMITIES WITH ULCER AND INFLAMMATION: ICD-10-CM

## 2025-07-02 DIAGNOSIS — I87.2 VENOUS (PERIPHERAL) INSUFFICIENCY: ICD-10-CM

## 2025-07-02 DIAGNOSIS — I83.219 VARICOSE VEINS OF LOWER EXTREMITIES WITH ULCER AND INFLAMMATION: ICD-10-CM

## 2025-07-02 PROCEDURE — 11043 DBRDMT MUSC&/FSCA 1ST 20/<: CPT

## 2025-07-02 PROCEDURE — 99213 OFFICE O/P EST LOW 20 MIN: CPT | Mod: 25

## 2025-07-02 PROCEDURE — 11042 DBRDMT SUBQ TIS 1ST 20SQCM/<: CPT

## 2025-07-05 LAB
BACTERIA SPEC AEROBE CULT: ABNORMAL
BACTERIA SPEC ANAEROBE CULT: ABNORMAL

## 2025-07-08 LAB
BACTERIA SPEC AEROBE CULT: ABNORMAL
BACTERIA SPEC ANAEROBE CULT: ABNORMAL

## 2025-07-09 ENCOUNTER — OFFICE VISIT (OUTPATIENT)
Dept: WOUND CARE | Facility: CLINIC | Age: 55
End: 2025-07-09
Payer: MEDICAID

## 2025-07-09 PROCEDURE — 11043 DBRDMT MUSC&/FSCA 1ST 20/<: CPT

## 2025-07-09 PROCEDURE — 11046 DBRDMT MUSC&/FSCA EA ADDL: CPT

## 2025-07-10 NOTE — PROGRESS NOTES
"Subjective   Chief complaint: Colt Lewis is a 53 y.o. male who presents for 3 month Follow-up.    HPI:  HPI  Chronic disease management.  Active problems noted Tetrex.  Accompanied by family member.  Blood pressure is down better.  Cardiology follow-up noted.  Secondary hypertension workup was unremarkable.  Labs are stable.  Blood pressure is improved.  Recent medication changes are noted.  I encouraged medical compliance.  Encouraged physical activity.  Encouraged weight loss.  Encouraged dietary discussion.  Follow-up in 3 months.  Labs prior to follow-up.  Continue other current medical therapy in the interim.  Objective   /88 (BP Location: Left arm, Patient Position: Sitting)   Pulse 68   Temp 36.1 °C (97 °F)   Ht 1.702 m (5' 7\")   Wt (!) 155 kg (341 lb 9.6 oz)   SpO2 95% Comment: RA  BMI 53.50 kg/m²   Physical Exam  Vitals and nursing note reviewed.   Constitutional:       Appearance: Normal appearance.   HENT:      Head: Normocephalic and atraumatic.   Eyes:      Extraocular Movements: Extraocular movements intact.      Conjunctiva/sclera: Conjunctivae normal.      Pupils: Pupils are equal, round, and reactive to light.   Cardiovascular:      Rate and Rhythm: Normal rate and regular rhythm.      Heart sounds: Normal heart sounds.   Pulmonary:      Effort: Pulmonary effort is normal.      Breath sounds: Normal breath sounds.   Abdominal:      General: Abdomen is flat. Bowel sounds are normal.      Palpations: Abdomen is soft.   Musculoskeletal:         General: Normal range of motion.   Skin:     General: Skin is warm and dry.   Neurological:      General: No focal deficit present.      Mental Status: He is alert and oriented to person, place, and time. Mental status is at baseline.   Psychiatric:         Mood and Affect: Mood normal.         Behavior: Behavior normal.       Review of Systems   I have reviewed and reconciled the medication list with the patient today.   Current Outpatient " Stable on lisinopril      Medications:     amLODIPine (Norvasc) 10 mg tablet, TAKE 1 TABLET BY MOUTH EVERY DAY, Disp: 90 tablet, Rfl: 1    buPROPion SR (Wellbutrin SR) 150 mg 12 hr tablet, Take 1 tablet by mouth twice daily., Disp: 180 tablet, Rfl: 1    calcium carb/mag oxide/Cu/zinc (calcium-magnesium-copper-zinc) tablet, Take 1 tablet by mouth once daily., Disp: , Rfl:     cholecalciferol (Vitamin D-3) 25 MCG (1000 UT) tablet, Take 1 tablet (25 mcg) by mouth once daily., Disp: , Rfl:     cloNIDine (Catapres-TTS) 0.3 mg/24 hr patch, Place 1 patch on the skin 1 (one) time per week., Disp: 8 patch, Rfl: 1    doxazosin (Cardura) 2 mg tablet, Take 1 tablet (2 mg) by mouth 2 times a day., Disp: 180 tablet, Rfl: 3    escitalopram (Lexapro) 5 mg tablet, Take 1 tablet (5 mg) by mouth once daily., Disp: , Rfl:     metoprolol succinate XL (Toprol-XL) 100 mg 24 hr tablet, Take 1 tablet (100 mg) by mouth once daily., Disp: 90 tablet, Rfl: 1    miscellaneous medical supply (Blood Pressure Cuff) misc, 1 Device once daily., Disp: 1 each, Rfl: 0    naltrexone (Depade) 50 mg tablet, Take 1 tablet (50 mg) by mouth once daily., Disp: , Rfl:     omeprazole (PriLOSEC) 20 mg DR capsule, Take 1 capsule (20 mg) by mouth once daily., Disp: , Rfl:     potassium chloride CR 10 mEq ER tablet, Take 1 tablet (10 mEq) by mouth 2 times a day., Disp: , Rfl:     valsartan-hydrochlorothiazide (Diovan-HCT) 320-25 mg tablet, TAKE 1 TABLET BY MOUTH EVERY DAY, Disp: 90 tablet, Rfl: 1     Imaging:  No results found.     Labs reviewed:    Lab Results   Component Value Date    WBC 6.9 03/17/2024    HGB 12.6 (L) 03/17/2024    HCT 37.9 (L) 03/17/2024     03/17/2024    CHOL 185 03/04/2024    TRIG 108 03/04/2024    HDL 45.8 03/04/2024    ALT 12 03/15/2024    AST 13 03/15/2024     (L) 03/17/2024    K 3.8 03/17/2024     03/17/2024    CREATININE 0.93 03/17/2024    BUN 23 03/17/2024    CO2 28 03/17/2024    TSH 1.20 03/15/2024    PSA 0.15 03/04/2024    INR 1.3 (H)  03/31/2023       Assessment/Plan   Problem List Items Addressed This Visit             ICD-10-CM    Essential hypertension, benign - Primary I10    Intellectual disability F79    Mixed hyperlipidemia E78.2    Morbid obesity (Multi) E66.01    Resistant hypertension I1A.0     Other Visit Diagnoses         Codes    Vitamin D deficiency     E55.9            Reinforced lifestyle modifications  Continue current medications as listed  Physical activity as tolerated and healthy diet encouraged  Maintain a healthy weight  Follow up in  3mo

## 2025-07-16 ENCOUNTER — OFFICE VISIT (OUTPATIENT)
Dept: WOUND CARE | Facility: CLINIC | Age: 55
End: 2025-07-16
Payer: MEDICAID

## 2025-07-16 PROCEDURE — 11042 DBRDMT SUBQ TIS 1ST 20SQCM/<: CPT

## 2025-07-16 PROCEDURE — 11045 DBRDMT SUBQ TISS EACH ADDL: CPT

## 2025-07-21 DIAGNOSIS — I10 ESSENTIAL HYPERTENSION, BENIGN: ICD-10-CM

## 2025-07-22 RX ORDER — CLONIDINE 0.3 MG/24H
PATCH, EXTENDED RELEASE TRANSDERMAL
Qty: 8 PATCH | Refills: 1 | Status: SHIPPED | OUTPATIENT
Start: 2025-07-22

## 2025-07-22 RX ORDER — AMLODIPINE BESYLATE 10 MG/1
10 TABLET ORAL DAILY
Qty: 90 TABLET | Refills: 3 | Status: SHIPPED | OUTPATIENT
Start: 2025-07-22 | End: 2026-07-22

## 2025-07-23 ENCOUNTER — OFFICE VISIT (OUTPATIENT)
Dept: WOUND CARE | Facility: CLINIC | Age: 55
End: 2025-07-23
Payer: MEDICAID

## 2025-07-23 PROCEDURE — 11042 DBRDMT SUBQ TIS 1ST 20SQCM/<: CPT

## 2025-07-30 ENCOUNTER — OFFICE VISIT (OUTPATIENT)
Dept: WOUND CARE | Facility: CLINIC | Age: 55
End: 2025-07-30
Payer: MEDICAID

## 2025-07-30 PROCEDURE — 11042 DBRDMT SUBQ TIS 1ST 20SQCM/<: CPT

## 2025-08-04 DIAGNOSIS — N32.81 OVERACTIVE BLADDER: ICD-10-CM

## 2025-08-04 DIAGNOSIS — R35.0 URINARY FREQUENCY: ICD-10-CM

## 2025-08-04 DIAGNOSIS — I10 ESSENTIAL HYPERTENSION, BENIGN: ICD-10-CM

## 2025-08-04 DIAGNOSIS — R32 URINARY INCONTINENCE, UNSPECIFIED TYPE: ICD-10-CM

## 2025-08-05 DIAGNOSIS — R35.0 URINARY FREQUENCY: ICD-10-CM

## 2025-08-05 DIAGNOSIS — N32.81 OVERACTIVE BLADDER: ICD-10-CM

## 2025-08-05 DIAGNOSIS — R32 URINARY INCONTINENCE, UNSPECIFIED TYPE: ICD-10-CM

## 2025-08-05 RX ORDER — VALSARTAN AND HYDROCHLOROTHIAZIDE 320; 25 MG/1; MG/1
1 TABLET, FILM COATED ORAL DAILY
Qty: 90 TABLET | Refills: 3 | Status: SHIPPED | OUTPATIENT
Start: 2025-08-05

## 2025-08-05 RX ORDER — METOPROLOL SUCCINATE 100 MG/1
100 TABLET, EXTENDED RELEASE ORAL DAILY
Qty: 90 TABLET | Refills: 3 | Status: SHIPPED | OUTPATIENT
Start: 2025-08-05

## 2025-08-05 RX ORDER — MIRABEGRON 25 MG/1
25 TABLET, FILM COATED, EXTENDED RELEASE ORAL DAILY
Qty: 30 TABLET | Refills: 3 | OUTPATIENT
Start: 2025-08-05

## 2025-08-06 ENCOUNTER — OFFICE VISIT (OUTPATIENT)
Dept: WOUND CARE | Facility: CLINIC | Age: 55
End: 2025-08-06
Payer: MEDICAID

## 2025-08-06 ENCOUNTER — APPOINTMENT (OUTPATIENT)
Dept: UROLOGY | Facility: CLINIC | Age: 55
End: 2025-08-06
Payer: MEDICAID

## 2025-08-06 PROCEDURE — 11042 DBRDMT SUBQ TIS 1ST 20SQCM/<: CPT

## 2025-08-06 RX ORDER — MIRABEGRON 25 MG/1
25 TABLET, FILM COATED, EXTENDED RELEASE ORAL DAILY
Qty: 30 TABLET | Refills: 3 | Status: SHIPPED | OUTPATIENT
Start: 2025-08-06

## 2025-08-13 ENCOUNTER — OFFICE VISIT (OUTPATIENT)
Dept: WOUND CARE | Facility: CLINIC | Age: 55
End: 2025-08-13
Payer: MEDICAID

## 2025-08-13 PROCEDURE — 11042 DBRDMT SUBQ TIS 1ST 20SQCM/<: CPT

## 2025-08-20 ENCOUNTER — OFFICE VISIT (OUTPATIENT)
Dept: WOUND CARE | Facility: CLINIC | Age: 55
End: 2025-08-20
Payer: MEDICAID

## 2025-08-20 PROCEDURE — 11042 DBRDMT SUBQ TIS 1ST 20SQCM/<: CPT

## 2025-09-03 ENCOUNTER — APPOINTMENT (OUTPATIENT)
Dept: WOUND CARE | Facility: CLINIC | Age: 55
End: 2025-09-03
Payer: MEDICAID

## 2025-09-03 DIAGNOSIS — E87.6 HYPOKALEMIA: ICD-10-CM

## 2025-09-03 DIAGNOSIS — I16.0 HYPERTENSIVE URGENCY: ICD-10-CM

## 2025-09-03 RX ORDER — ESCITALOPRAM OXALATE 5 MG/1
5 TABLET ORAL DAILY
Qty: 90 TABLET | Refills: 3 | Status: SHIPPED | OUTPATIENT
Start: 2025-09-03

## 2025-09-03 RX ORDER — POTASSIUM CHLORIDE 750 MG/1
10 TABLET, EXTENDED RELEASE ORAL 2 TIMES DAILY
Qty: 180 TABLET | Refills: 3 | Status: SHIPPED | OUTPATIENT
Start: 2025-09-03 | End: 2025-09-05 | Stop reason: SDUPTHER

## 2025-09-04 ENCOUNTER — APPOINTMENT (OUTPATIENT)
Dept: PRIMARY CARE | Facility: CLINIC | Age: 55
End: 2025-09-04
Payer: MEDICAID

## 2025-09-04 ASSESSMENT — COLUMBIA-SUICIDE SEVERITY RATING SCALE - C-SSRS
2. HAVE YOU ACTUALLY HAD ANY THOUGHTS OF KILLING YOURSELF?: NO
1. IN THE PAST MONTH, HAVE YOU WISHED YOU WERE DEAD OR WISHED YOU COULD GO TO SLEEP AND NOT WAKE UP?: NO
6. HAVE YOU EVER DONE ANYTHING, STARTED TO DO ANYTHING, OR PREPARED TO DO ANYTHING TO END YOUR LIFE?: NO

## 2025-09-05 ENCOUNTER — OFFICE VISIT (OUTPATIENT)
Dept: WOUND CARE | Facility: CLINIC | Age: 55
End: 2025-09-05
Payer: MEDICAID

## 2025-09-05 DIAGNOSIS — E11.9 TYPE 2 DIABETES MELLITUS WITHOUT COMPLICATION, WITHOUT LONG-TERM CURRENT USE OF INSULIN: ICD-10-CM

## 2025-09-05 DIAGNOSIS — E87.6 HYPOKALEMIA: ICD-10-CM

## 2025-09-05 DIAGNOSIS — K21.9 GASTRO-ESOPHAGEAL REFLUX DISEASE WITHOUT ESOPHAGITIS: ICD-10-CM

## 2025-09-05 PROCEDURE — 11042 DBRDMT SUBQ TIS 1ST 20SQCM/<: CPT

## 2025-09-05 PROCEDURE — 97597 DBRDMT OPN WND 1ST 20 CM/<: CPT

## 2025-09-05 RX ORDER — METFORMIN HYDROCHLORIDE 500 MG/1
500 TABLET ORAL
Qty: 180 TABLET | Refills: 1 | Status: SHIPPED | OUTPATIENT
Start: 2025-09-05 | End: 2026-10-10

## 2025-09-05 RX ORDER — OMEPRAZOLE 20 MG/1
20 CAPSULE, DELAYED RELEASE ORAL DAILY
Qty: 90 CAPSULE | Refills: 1 | Status: SHIPPED | OUTPATIENT
Start: 2025-09-05

## 2025-09-05 RX ORDER — POTASSIUM CHLORIDE 750 MG/1
10 TABLET, EXTENDED RELEASE ORAL 2 TIMES DAILY
Qty: 180 TABLET | Refills: 3 | Status: SHIPPED | OUTPATIENT
Start: 2025-09-05

## 2025-11-12 ENCOUNTER — APPOINTMENT (OUTPATIENT)
Dept: UROLOGY | Facility: CLINIC | Age: 55
End: 2025-11-12
Payer: MEDICAID

## 2025-12-04 ENCOUNTER — APPOINTMENT (OUTPATIENT)
Dept: PRIMARY CARE | Facility: CLINIC | Age: 55
End: 2025-12-04
Payer: MEDICAID

## 2026-06-15 ENCOUNTER — APPOINTMENT (OUTPATIENT)
Dept: CARDIOLOGY | Facility: CLINIC | Age: 56
End: 2026-06-15
Payer: MEDICAID